# Patient Record
Sex: FEMALE | Race: WHITE | NOT HISPANIC OR LATINO | Employment: OTHER | URBAN - METROPOLITAN AREA
[De-identification: names, ages, dates, MRNs, and addresses within clinical notes are randomized per-mention and may not be internally consistent; named-entity substitution may affect disease eponyms.]

---

## 2017-01-30 ENCOUNTER — HISTORICAL (OUTPATIENT)
Dept: HEMATOLOGY/ONCOLOGY | Facility: CLINIC | Age: 72
End: 2017-01-30

## 2017-03-17 LAB
BUN SERPL-MCNC: 14 MG/DL (ref 7–18)
CREAT SERPL-MCNC: 0.77 MG/DL (ref 0.6–1.3)
GLUCOSE SERPL-MCNC: 94 MG/DL (ref 74–106)

## 2017-05-10 ENCOUNTER — HISTORICAL (OUTPATIENT)
Dept: ADMINISTRATIVE | Facility: HOSPITAL | Age: 72
End: 2017-05-10

## 2017-05-10 LAB — HCV AB SERPL QL IA: NEGATIVE

## 2017-05-15 ENCOUNTER — HISTORICAL (OUTPATIENT)
Dept: ADMINISTRATIVE | Facility: HOSPITAL | Age: 72
End: 2017-05-15

## 2017-06-12 ENCOUNTER — HISTORICAL (OUTPATIENT)
Dept: ADMINISTRATIVE | Facility: HOSPITAL | Age: 72
End: 2017-06-12

## 2017-06-12 LAB
ALBUMIN SERPL-MCNC: 3.7 GM/DL (ref 3.4–5)
ALBUMIN/GLOB SERPL: 1.5 RATIO (ref 1.1–2)
ALP SERPL-CCNC: 57 UNIT/L (ref 38–126)
ALT SERPL-CCNC: 22 UNIT/L (ref 12–78)
AST SERPL-CCNC: 14 UNIT/L (ref 15–37)
BILIRUB SERPL-MCNC: 0.5 MG/DL (ref 0.2–1)
BILIRUBIN DIRECT+TOT PNL SERPL-MCNC: 0.1 MG/DL (ref 0–0.5)
BILIRUBIN DIRECT+TOT PNL SERPL-MCNC: 0.4 MG/DL (ref 0–0.8)
BUN SERPL-MCNC: 20 MG/DL (ref 7–18)
CALCIUM SERPL-MCNC: 9.2 MG/DL (ref 8.5–10.1)
CHLORIDE SERPL-SCNC: 104 MMOL/L (ref 98–107)
CHOLEST SERPL-MCNC: 184 MG/DL (ref 0–200)
CHOLEST/HDLC SERPL: 2.5 {RATIO} (ref 0–4)
CO2 SERPL-SCNC: 28 MMOL/L (ref 21–32)
CREAT SERPL-MCNC: 0.73 MG/DL (ref 0.55–1.02)
GLOBULIN SER-MCNC: 2.5 GM/DL (ref 2.4–3.5)
GLUCOSE SERPL-MCNC: 88 MG/DL (ref 74–106)
HDLC SERPL-MCNC: 75 MG/DL (ref 35–60)
LDLC SERPL CALC-MCNC: 82 MG/DL (ref 0–129)
POTASSIUM SERPL-SCNC: 4.6 MMOL/L (ref 3.5–5.1)
PROT SERPL-MCNC: 6.2 GM/DL (ref 6.4–8.2)
SODIUM SERPL-SCNC: 138 MMOL/L (ref 136–145)
TRIGL SERPL-MCNC: 134 MG/DL (ref 30–150)
TSH SERPL-ACNC: 0.08 MIU/ML (ref 0.36–3.74)
VLDLC SERPL CALC-MCNC: 27 MG/DL

## 2017-10-20 ENCOUNTER — HISTORICAL (OUTPATIENT)
Dept: ADMINISTRATIVE | Facility: HOSPITAL | Age: 72
End: 2017-10-20

## 2018-11-05 ENCOUNTER — HISTORICAL (OUTPATIENT)
Dept: ADMINISTRATIVE | Facility: HOSPITAL | Age: 73
End: 2018-11-05

## 2018-11-06 ENCOUNTER — HISTORICAL (OUTPATIENT)
Dept: ADMINISTRATIVE | Facility: HOSPITAL | Age: 73
End: 2018-11-06

## 2018-11-08 LAB — FINAL CULTURE: NO GROWTH

## 2018-11-11 LAB — FINAL CULTURE: NORMAL

## 2018-11-20 ENCOUNTER — HISTORICAL (OUTPATIENT)
Dept: ADMINISTRATIVE | Facility: HOSPITAL | Age: 73
End: 2018-11-20

## 2018-12-19 ENCOUNTER — HISTORICAL (OUTPATIENT)
Dept: ADMINISTRATIVE | Facility: HOSPITAL | Age: 73
End: 2018-12-19

## 2019-02-08 ENCOUNTER — HISTORICAL (OUTPATIENT)
Dept: ADMINISTRATIVE | Facility: HOSPITAL | Age: 74
End: 2019-02-08

## 2019-02-08 LAB
ABS NEUT (OLG): 3.51 X10(3)/MCL (ref 2.1–9.2)
ALBUMIN SERPL-MCNC: 3.7 GM/DL (ref 3.4–5)
ALBUMIN/GLOB SERPL: 1.6 RATIO (ref 1.1–2)
ALP SERPL-CCNC: 57 UNIT/L (ref 38–126)
ALT SERPL-CCNC: 15 UNIT/L (ref 12–78)
AST SERPL-CCNC: 12 UNIT/L (ref 15–37)
BASOPHILS # BLD AUTO: 0 X10(3)/MCL (ref 0–0.2)
BASOPHILS NFR BLD AUTO: 0.7 %
BILIRUB SERPL-MCNC: 0.3 MG/DL (ref 0.2–1)
BILIRUBIN DIRECT+TOT PNL SERPL-MCNC: 0.1 MG/DL (ref 0–0.5)
BILIRUBIN DIRECT+TOT PNL SERPL-MCNC: 0.2 MG/DL (ref 0–0.8)
BUN SERPL-MCNC: 13 MG/DL (ref 7–18)
CALCIUM SERPL-MCNC: 9.3 MG/DL (ref 8.5–10.1)
CHLORIDE SERPL-SCNC: 104 MMOL/L (ref 98–107)
CO2 SERPL-SCNC: 29 MMOL/L (ref 21–32)
CREAT SERPL-MCNC: 0.68 MG/DL (ref 0.55–1.02)
EOSINOPHIL # BLD AUTO: 0.3 X10(3)/MCL (ref 0–0.9)
EOSINOPHIL NFR BLD AUTO: 5.4 %
ERYTHROCYTE [DISTWIDTH] IN BLOOD BY AUTOMATED COUNT: 12 % (ref 11.5–17)
GLOBULIN SER-MCNC: 2.3 GM/DL (ref 2.4–3.5)
GLUCOSE SERPL-MCNC: 99 MG/DL (ref 74–106)
HCT VFR BLD AUTO: 38.5 % (ref 37–47)
HGB BLD-MCNC: 12.2 GM/DL (ref 12–16)
LYMPHOCYTES # BLD AUTO: 1.2 X10(3)/MCL (ref 0.6–4.6)
LYMPHOCYTES NFR BLD AUTO: 21.2 %
MCH RBC QN AUTO: 31 PG (ref 27–31)
MCHC RBC AUTO-ENTMCNC: 31.7 GM/DL (ref 33–36)
MCV RBC AUTO: 98 FL (ref 80–94)
MONOCYTES # BLD AUTO: 0.5 X10(3)/MCL (ref 0.1–1.3)
MONOCYTES NFR BLD AUTO: 9.5 %
NEUTROPHILS # BLD AUTO: 3.5 X10(3)/MCL (ref 2.1–9.2)
NEUTROPHILS NFR BLD AUTO: 63 %
PLATELET # BLD AUTO: 304 X10(3)/MCL (ref 130–400)
PMV BLD AUTO: 9 FL (ref 9.4–12.4)
POTASSIUM SERPL-SCNC: 4.8 MMOL/L (ref 3.5–5.1)
PROT SERPL-MCNC: 6 GM/DL (ref 6.4–8.2)
RBC # BLD AUTO: 3.93 X10(6)/MCL (ref 4.2–5.4)
SODIUM SERPL-SCNC: 138 MMOL/L (ref 136–145)
WBC # SPEC AUTO: 5.6 X10(3)/MCL (ref 4.5–11.5)

## 2019-04-03 ENCOUNTER — HISTORICAL (OUTPATIENT)
Dept: ADMINISTRATIVE | Facility: HOSPITAL | Age: 74
End: 2019-04-03

## 2019-04-03 LAB
ABS NEUT (OLG): 3.66 X10(3)/MCL (ref 2.1–9.2)
ALBUMIN SERPL-MCNC: 3.8 GM/DL (ref 3.4–5)
ALBUMIN/GLOB SERPL: 1.5 RATIO (ref 1.1–2)
ALP SERPL-CCNC: 52 UNIT/L (ref 38–126)
ALT SERPL-CCNC: 17 UNIT/L (ref 12–78)
AST SERPL-CCNC: 14 UNIT/L (ref 15–37)
BASOPHILS # BLD AUTO: 0 X10(3)/MCL (ref 0–0.2)
BASOPHILS NFR BLD AUTO: 0 %
BILIRUB SERPL-MCNC: 0.4 MG/DL (ref 0.2–1)
BILIRUBIN DIRECT+TOT PNL SERPL-MCNC: 0.1 MG/DL (ref 0–0.5)
BILIRUBIN DIRECT+TOT PNL SERPL-MCNC: 0.3 MG/DL (ref 0–0.8)
BUN SERPL-MCNC: 19 MG/DL (ref 7–18)
CALCIUM SERPL-MCNC: 9.3 MG/DL (ref 8.5–10.1)
CHLORIDE SERPL-SCNC: 104 MMOL/L (ref 98–107)
CHOLEST SERPL-MCNC: 175 MG/DL (ref 0–200)
CHOLEST/HDLC SERPL: 2.4 {RATIO} (ref 0–4)
CO2 SERPL-SCNC: 28 MMOL/L (ref 21–32)
CREAT SERPL-MCNC: 0.78 MG/DL (ref 0.55–1.02)
EOSINOPHIL # BLD AUTO: 0.2 X10(3)/MCL (ref 0–0.9)
EOSINOPHIL NFR BLD AUTO: 4 %
ERYTHROCYTE [DISTWIDTH] IN BLOOD BY AUTOMATED COUNT: 13 % (ref 11.5–17)
GLOBULIN SER-MCNC: 2.6 GM/DL (ref 2.4–3.5)
GLUCOSE SERPL-MCNC: 85 MG/DL (ref 74–106)
HCT VFR BLD AUTO: 40.2 % (ref 37–47)
HDLC SERPL-MCNC: 72 MG/DL (ref 35–60)
HGB BLD-MCNC: 12.8 GM/DL (ref 12–16)
LDLC SERPL CALC-MCNC: 83 MG/DL (ref 0–129)
LYMPHOCYTES # BLD AUTO: 1.4 X10(3)/MCL (ref 0.6–4.6)
LYMPHOCYTES NFR BLD AUTO: 23 %
MCH RBC QN AUTO: 30.9 PG (ref 27–31)
MCHC RBC AUTO-ENTMCNC: 31.8 GM/DL (ref 33–36)
MCV RBC AUTO: 97.1 FL (ref 80–94)
MONOCYTES # BLD AUTO: 0.5 X10(3)/MCL (ref 0.1–1.3)
MONOCYTES NFR BLD AUTO: 9 %
NEUTROPHILS # BLD AUTO: 3.66 X10(3)/MCL (ref 2.1–9.2)
NEUTROPHILS NFR BLD AUTO: 63 %
PLATELET # BLD AUTO: 317 X10(3)/MCL (ref 130–400)
PMV BLD AUTO: 10.3 FL (ref 9.4–12.4)
POTASSIUM SERPL-SCNC: 4.4 MMOL/L (ref 3.5–5.1)
PROT SERPL-MCNC: 6.4 GM/DL (ref 6.4–8.2)
RBC # BLD AUTO: 4.14 X10(6)/MCL (ref 4.2–5.4)
SODIUM SERPL-SCNC: 139 MMOL/L (ref 136–145)
TRIGL SERPL-MCNC: 101 MG/DL (ref 30–150)
TSH SERPL-ACNC: 0.65 MIU/L (ref 0.36–3.74)
VLDLC SERPL CALC-MCNC: 20 MG/DL
WBC # SPEC AUTO: 5.8 X10(3)/MCL (ref 4.5–11.5)

## 2019-08-26 ENCOUNTER — HISTORICAL (OUTPATIENT)
Dept: ADMINISTRATIVE | Facility: HOSPITAL | Age: 74
End: 2019-08-26

## 2019-12-18 ENCOUNTER — HISTORICAL (OUTPATIENT)
Dept: ADMINISTRATIVE | Facility: HOSPITAL | Age: 74
End: 2019-12-18

## 2020-07-15 ENCOUNTER — HISTORICAL (OUTPATIENT)
Dept: ADMINISTRATIVE | Facility: HOSPITAL | Age: 75
End: 2020-07-15

## 2020-07-15 LAB
ABS NEUT (OLG): 3.93 X10(3)/MCL (ref 2.1–9.2)
ALBUMIN SERPL-MCNC: 3.9 GM/DL (ref 3.4–4.8)
ALBUMIN/GLOB SERPL: 1.7 RATIO (ref 1.1–2)
ALP SERPL-CCNC: 53 UNIT/L (ref 40–150)
ALT SERPL-CCNC: 9 UNIT/L (ref 0–55)
AST SERPL-CCNC: 15 UNIT/L (ref 5–34)
BASOPHILS # BLD AUTO: 0.03 X10(3)/MCL (ref 0–0.2)
BASOPHILS NFR BLD AUTO: 0.5 % (ref 0–1)
BILIRUB SERPL-MCNC: 0.4 MG/DL (ref 0.2–1.2)
BILIRUBIN DIRECT+TOT PNL SERPL-MCNC: 0.2 MG/DL (ref 0–0.5)
BILIRUBIN DIRECT+TOT PNL SERPL-MCNC: 0.2 MG/DL (ref 0–0.8)
BUN SERPL-MCNC: 12.3 MG/DL (ref 9.8–20.1)
CALCIUM SERPL-MCNC: 9.4 MG/DL (ref 8.4–10.2)
CHLORIDE SERPL-SCNC: 100 MMOL/L (ref 98–107)
CHOLEST SERPL-MCNC: 170 MG/DL
CHOLEST/HDLC SERPL: 3 {RATIO} (ref 0–5)
CO2 SERPL-SCNC: 28 MMOL/L (ref 23–31)
CREAT SERPL-MCNC: 0.72 MG/DL (ref 0.57–1.11)
EOSINOPHIL # BLD AUTO: 0.25 X10(3)/MCL (ref 0–0.9)
EOSINOPHIL NFR BLD AUTO: 4.2 % (ref 0–6.4)
ERYTHROCYTE [DISTWIDTH] IN BLOOD BY AUTOMATED COUNT: 12.6 % (ref 11.5–17)
GLOBULIN SER-MCNC: 2.3 GM/DL (ref 2.4–3.5)
GLUCOSE SERPL-MCNC: 98 MG/DL (ref 82–115)
HCT VFR BLD AUTO: 39.4 % (ref 37–47)
HDLC SERPL-MCNC: 66 MG/DL (ref 40–60)
HGB BLD-MCNC: 13.4 GM/DL (ref 12–16)
IMM GRANULOCYTES # BLD AUTO: 0.02 10*3/UL (ref 0–0.02)
IMM GRANULOCYTES NFR BLD AUTO: 0.3 % (ref 0–0.43)
LDLC SERPL CALC-MCNC: 92 MG/DL (ref 50–140)
LYMPHOCYTES # BLD AUTO: 1.35 X10(3)/MCL (ref 0.6–4.6)
LYMPHOCYTES NFR BLD AUTO: 22.5 % (ref 16–44)
MCH RBC QN AUTO: 32.1 PG (ref 27–31)
MCHC RBC AUTO-ENTMCNC: 34 GM/DL (ref 33–36)
MCV RBC AUTO: 94.3 FL (ref 80–94)
MONOCYTES # BLD AUTO: 0.43 X10(3)/MCL (ref 0.1–1.3)
MONOCYTES NFR BLD AUTO: 7.2 % (ref 4–12.1)
NEUTROPHILS # BLD AUTO: 3.93 X10(3)/MCL (ref 2.1–9.2)
NEUTROPHILS NFR BLD AUTO: 65.3 % (ref 43–73)
NRBC BLD AUTO-RTO: 0 % (ref 0–0.2)
PLATELET # BLD AUTO: 283 X10(3)/MCL (ref 130–400)
PMV BLD AUTO: 9.4 FL (ref 7.4–10.4)
POTASSIUM SERPL-SCNC: 4.7 MMOL/L (ref 3.5–5.1)
PROT SERPL-MCNC: 6.2 GM/DL (ref 5.8–7.6)
RBC # BLD AUTO: 4.18 X10(6)/MCL (ref 4.2–5.4)
SODIUM SERPL-SCNC: 135 MMOL/L (ref 136–145)
TRIGL SERPL-MCNC: 61 MG/DL (ref 0–150)
TSH SERPL-ACNC: 0.66 UIU/ML (ref 0.35–4.94)
VLDLC SERPL CALC-MCNC: 12 MG/DL
WBC # SPEC AUTO: 6 X10(3)/MCL (ref 4.5–11.5)

## 2021-02-02 ENCOUNTER — HISTORICAL (OUTPATIENT)
Dept: ADMINISTRATIVE | Facility: HOSPITAL | Age: 76
End: 2021-02-02

## 2021-02-03 ENCOUNTER — HISTORICAL (OUTPATIENT)
Dept: ADMINISTRATIVE | Facility: HOSPITAL | Age: 76
End: 2021-02-03

## 2021-02-17 ENCOUNTER — HISTORICAL (OUTPATIENT)
Dept: ADMINISTRATIVE | Facility: HOSPITAL | Age: 76
End: 2021-02-17

## 2021-03-17 ENCOUNTER — HISTORICAL (OUTPATIENT)
Dept: ADMINISTRATIVE | Facility: HOSPITAL | Age: 76
End: 2021-03-17

## 2021-05-25 ENCOUNTER — HISTORICAL (OUTPATIENT)
Dept: ADMINISTRATIVE | Facility: HOSPITAL | Age: 76
End: 2021-05-25

## 2021-05-25 LAB
ALBUMIN SERPL-MCNC: 4 GM/DL (ref 3.4–4.8)
ALBUMIN/GLOB SERPL: 1.4 RATIO (ref 1.1–2)
ALP SERPL-CCNC: 50 UNIT/L (ref 40–150)
ALT SERPL-CCNC: 11 UNIT/L (ref 0–55)
AST SERPL-CCNC: 15 UNIT/L (ref 5–34)
BILIRUB SERPL-MCNC: 0.4 MG/DL (ref 0.2–1.2)
BILIRUBIN DIRECT+TOT PNL SERPL-MCNC: 0.2 MG/DL (ref 0–0.5)
BILIRUBIN DIRECT+TOT PNL SERPL-MCNC: 0.2 MG/DL (ref 0–0.8)
BUN SERPL-MCNC: 10.9 MG/DL (ref 9.8–20.1)
CALCIUM SERPL-MCNC: 10.1 MG/DL (ref 8.4–10.2)
CHLORIDE SERPL-SCNC: 102 MMOL/L (ref 98–107)
CHOLEST SERPL-MCNC: 164 MG/DL
CHOLEST/HDLC SERPL: 3 {RATIO} (ref 0–5)
CO2 SERPL-SCNC: 30 MMOL/L (ref 23–31)
CREAT SERPL-MCNC: 0.83 MG/DL (ref 0.57–1.11)
GLOBULIN SER-MCNC: 2.8 GM/DL (ref 2.4–3.5)
GLUCOSE SERPL-MCNC: 98 MG/DL (ref 82–115)
HDLC SERPL-MCNC: 59 MG/DL (ref 40–60)
LDLC SERPL CALC-MCNC: 92 MG/DL (ref 50–140)
POTASSIUM SERPL-SCNC: 4.6 MMOL/L (ref 3.5–5.1)
PROT SERPL-MCNC: 6.8 GM/DL (ref 5.8–7.6)
SODIUM SERPL-SCNC: 138 MMOL/L (ref 136–145)
TRIGL SERPL-MCNC: 66 MG/DL (ref 0–150)
VLDLC SERPL CALC-MCNC: 13 MG/DL

## 2021-08-24 LAB — CRC RECOMMENDATION EXT: NORMAL

## 2022-01-24 ENCOUNTER — HISTORICAL (OUTPATIENT)
Dept: LAB | Facility: HOSPITAL | Age: 77
End: 2022-01-24

## 2022-01-24 LAB
ALBUMIN SERPL-MCNC: 4.1 GM/DL (ref 3.4–4.8)
ALBUMIN/GLOB SERPL: 1.6 RATIO (ref 1.1–2)
ALP SERPL-CCNC: 57 UNIT/L (ref 40–150)
ALT SERPL-CCNC: 9 UNIT/L (ref 0–55)
AST SERPL-CCNC: 16 UNIT/L (ref 5–34)
BILIRUB SERPL-MCNC: 0.3 MG/DL (ref 0.2–1.2)
BILIRUBIN DIRECT+TOT PNL SERPL-MCNC: 0.1 MG/DL (ref 0–0.5)
BILIRUBIN DIRECT+TOT PNL SERPL-MCNC: 0.2 MG/DL (ref 0–0.8)
BUN SERPL-MCNC: 13.4 MG/DL (ref 9.8–20.1)
CALCIUM SERPL-MCNC: 10 MG/DL (ref 8.4–10.2)
CHLORIDE SERPL-SCNC: 99 MMOL/L (ref 98–107)
CO2 SERPL-SCNC: 27 MMOL/L (ref 23–31)
CREAT SERPL-MCNC: 0.8 MG/DL (ref 0.57–1.11)
DEPRECATED CALCIDIOL+CALCIFEROL SERPL-MC: 59.4 NG/ML (ref 30–80)
GLOBULIN SER-MCNC: 2.5 GM/DL (ref 2.4–3.5)
GLUCOSE SERPL-MCNC: 95 MG/DL (ref 82–115)
POTASSIUM SERPL-SCNC: 4.3 MMOL/L (ref 3.5–5.1)
PROT SERPL-MCNC: 6.6 GM/DL (ref 5.8–7.6)
SODIUM SERPL-SCNC: 135 MMOL/L (ref 136–145)
URATE SERPL-MCNC: 2.6 MG/DL (ref 2.6–6)

## 2022-03-25 LAB
BCS RECOMMENDATION EXT: NORMAL
BMD RECOMMENDATION EXT: NORMAL

## 2022-04-07 ENCOUNTER — HISTORICAL (OUTPATIENT)
Dept: ADMINISTRATIVE | Facility: HOSPITAL | Age: 77
End: 2022-04-07
Payer: MEDICARE

## 2022-04-23 VITALS
WEIGHT: 186.31 LBS | HEIGHT: 60 IN | BODY MASS INDEX: 36.58 KG/M2 | OXYGEN SATURATION: 97 % | SYSTOLIC BLOOD PRESSURE: 118 MMHG | DIASTOLIC BLOOD PRESSURE: 68 MMHG

## 2022-04-30 NOTE — DISCHARGE SUMMARY
Patient:   Mary Lou Grajeda            MRN: [id number]            FIN: [id number]               Age:   73 years     Sex:  Female     :  1945   Associated Diagnoses:   Chronic constipation; DDD (degenerative disc disease), lumbar; Acid reflux; HTN (hypertension); Hyperlipemia; Hypothyroidism; Impaired gait and mobility; Lumbar stenosis; OAB (overactive bladder); Osteoarthritis of right hip; Personal history of breast cancer   Author:   Glen Avila      Discharge Information   Discharge Summary Information:  Discharged  2018.         Admitting physician: Franc Bal MD.         Discharge diagnosis: Chronic constipation (KMQ96-JI K59.00), DDD (degenerative disc disease), lumbar (URH66-HQ M51.36), Acid reflux (EAX59-HB K21.9), HTN (hypertension) (LTC98-CR I10), Hyperlipemia (LRV06-KI E78.5), Hypothyroidism (FYI93-AU E03.9), Impaired gait and mobility (MGN60-OE R26.89), Lumbar stenosis (MRP78-FE M48.061), OAB (overactive bladder) (FHS53-NZ N32.81), Osteoarthritis of right hip (AUF23-ZT M16.11), Personal history of breast cancer (CTB04-MD Z85.3).         Discharge medications: OTHER MEDICATIONS (Selected)   Inpatient Medications  Canceled  CeleBREX: 200 mg, form: Cap, Oral, Daily, first dose 18 9:00:00 CST  Completed  Ancef 2gm/50ml D5W (Premix): 2 gm, form: Infusion, IV Piggyback, q6hr, Infuse over: 30 minute(s), Order duration: 3 dose(s), first dose 18 17:00:00 CST, stop date 18 10:59:00 CST, Last dose within 24 hours after surgery.  Ancef: 2 gm, form: Infusion, IV Piggyback, On Call, Infuse over: 30 minute(s), Order duration: 1 dose(s), first dose 18 7:00:00 CST  Demerol HCl 25 mg/mL injectable solution: 25 mg, form: Injection, IV Push, As Indicated PRN for other (see comment), Order duration: 2 dose(s), first dose 18 11:30:00 CST, stop date Limited # of times, titrate for shivering  Neurontin 300 mg oral capsule: 300 mg, form: Cap, Oral, Once, first dose  11/28/18 12:00:00 CST, stop date 11/28/18 12:00:00 CST  Ofirmev 10 mg/mL intravenous solution: 1,000 mg, form: Infusion, IV Piggyback, q6hr, Infuse over: 15 minute(s), Order duration: 2 dose(s), first dose 11/27/18 16:00:00 CST, stop date 11/28/18 3:59:00 CST, Post op  Toradol: 30 mg, form: Injection, IV Push, [id number], Order duration: 3 dose(s), first dose 11/27/18 18:00:00 CST, stop date 11/28/18 11:59:00 CST  Discontinued  Buffered Lidocaine 1% - 1mL syringe: 0.5 mL, 5 mg =, form: Injection, ID, As Directed PRN for other (see comment), first dose 11/27/18 6:58:00 CST, May inject 0.5mL at IV site, if not allergic  Colace 100 mg oral capsule: 100 mg, form: Cap, Oral, Daily PRN for constipation, first dose 11/27/18 9:19:00 CST  Colace 100 mg oral capsule: 100 mg, form: Cap, Oral, Daily, first dose 11/28/18 9:00:00 CST  Dextrose 50% and Water  (50 mL vial/syringe): 25 mL, 12.5 gm =, form: Injection, IV Push, As Directed PRN for blood glucose, Infuse over: 5 minute(s), first dose 11/27/18 11:02:00 CST, Unconscious patient: Repeat as ordered per protocol.  Dextrose 50% and Water  (50 mL vial/syringe): 25 mL, 12.5 gm =, form: Injection, IV Push, Once PRN for blood glucose, Infuse over: 5 minute(s), first dose 11/27/18 11:02:00 CST, Unconscious patient: Look for other source of altered mental status.  Dextrose 50% and Water  (50 mL vial/syringe): 50 mL, 25 gm =, form: Injection, IV Push, As Directed PRN for blood glucose, Infuse over: 5 minute(s), first dose 11/27/18 11:02:00 CST, Unconscious patient: Repeat as ordered per protocol.  Dextrose 50% in Water intravenous solution: 25 mL, 12.5 gm =, form: Injection, IV Push, As Directed PRN for blood glucose, Infuse over: 5 minute(s), first dose 11/27/18 11:02:00 CST, Conscious patient.  Dilaudid: 0.5 mg, form: Injection, IV Push, q5min PRN for pain, Order duration: 20 dose(s), first dose 11/27/18 11:01:00 CST, stop date Limited # of times, moderate/severe.  May repeat  every 5 minutes until patient reaches pain level of mild or less, not to exc...  Dulcolax Laxative 10 mg RECTAL suppository: 10 mg, form: Supp, CO, Daily PRN for constipation, first dose 11/27/18 11:02:00 CST, give in unrelieved with MOM and Miralax  Linzess 145 mcg oral capsule: 145 mcg, form: Cap, Oral, Daily, first dose 11/28/18 9:00:00 CST  Milk of Magnesia: 30 mL, form: Susp, Oral, Daily PRN for constipation, first dose 11/27/18 11:02:00 CST  MiraLax (polyethylene glycol 3350): 17 gm, form: Powder-Recon, Oral, Daily PRN for constipation, first dose 11/27/18 11:02:00 CST, Give if unrelieved with Milk of Magnesia  MiraLax (polyethylene glycol 3350): 17 gm, form: Powder-Recon, Oral, Daily, first dose 11/28/18 9:00:00 CST, Give if unrelieved with Milk of Magnesia  NexIUM 40 mg oral delayed release capsule: 40 mg, form: Cap, Oral, Daily, first dose 11/28/18 6:00:00 CST  [place] 5 mg-325 mg oral tablet: 1 tab(s), form: Tab, Oral, q4hr PRN for pain, first dose 11/28/18 14:36:00 CST, Waste Code GILL  Norco 7.5 mg-325 mg oral tablet: 1 tab(s), form: Tab, Oral, q4hr PRN for as needed for pain, first dose 11/29/18 10:05:00 CST, Waste Code GILL  Norvasc 10 mg oral tablet: 10 mg, form: Tab, Oral, Daily, first dose 11/28/18 9:00:00 CST  Plasmalyte 1,000 mL: 1,000 mL, 1,000 mL, IV, 20 mL/hr, start date 11/27/18 6:58:00 CST  Protonix 40 mg ORAL enteric coated tablet: 40 mg, form: Tab-EC, Oral, Daily, first dose 11/28/18 6:00:00 CST  Restoril 15 mg oral capsule: 15 mg, form: Cap, Oral, At Bedtime PRN for insomnia, first dose 11/27/18 11:02:00 CST  Senokot S 50 mg-8.6 mg oral tablet: 2 tab(s), form: Tab, Oral, Once a day (at bedtime), first dose 11/27/18 21:00:00 CST  Sodium Chloride 0.9% intravenous solution 1,000 mL: 1,000 mL, 1,000 mL, IV, 150 mL/hr, start date 11/27/18 11:02:00 CST  Toradol: 10 mg, form: Tab, Oral, q8hr, Order duration: 4 day(s), first dose 11/28/18 14:00:00 CST, stop date 12/02/18 13:59:00 CST  Tylenol: 500 mg,  form: Tab, Oral, q4hr, first dose 18 12:00:00 CST, atc  Ultram: 50 mg, form: Tab, Oral, q4hr PRN for pain, first dose 18 11:02:00 CST, Pain Score 1-10  Valium: 5 mg, form: Tab, Oral, q6hr PRN for spasm, first dose 18 11:02:00 CST  Zofran ODT 4 mg oral tablet, disintegratin mg, form: Tab-Dis, Oral, q8hr PRN for nausea/vomiting, first dose 18 11:02:00 CST  Zofran: 4 mg, form: Injection, IV Push, Once-Unscheduled PRN for nausea, first dose 18 11:01:00 CST  Zofran: 4 mg, form: Injection, IV Push, q6hr PRN for nausea/vomiting, first dose 18 11:02:00 CST, If unable to tolerate PO  ascorbic acid: 500 mg, form: Tab, Oral, At Bedtime, first dose 18 21:00:00 CST  aspirin 81 mg oral Delayed Release (EC) tablet: 81 mg, form: Tab-EC, Oral, BID, first dose 18 9:00:00 CST  cyclobenzaprine: 10 mg, form: Tab, Oral, TID PRN for as needed for muscle spasm, first dose 18 10:22:00 CST  glucagon: 1 mg, form: Injection, IM, q10min PRN for blood glucose, first dose 18 11:02:00 CST, Conscious Patient with NO IV access available and BG < 45 mg/dl.  glucagon: 1 mg, form: Injection, IM, q10min PRN for blood glucose, first dose 18 11:02:00 CST, Unconscious patient: Patient with NO IV access available and BG < 70 mg/dl.  hydrALAZINE (Apresoline) Inj.: 10 mg, form: Injection, IV Push, q4hr PRN for hypertension, first dose 18 11:02:00 CST, SBP >160  insulin lispro: 2-14 units, form: Injection, Subcutaneous, As Directed PRN for blood glucose, first dose 18 11:02:00 CST  levothyroxine 25 mcg (0.025 mg) oral tablet: 75 mcg, form: Tab, Oral, Daily, first dose 18 6:00:00 CST  lisinopril 10 mg oral tablet: 20 mg, form: Tab, Oral, Daily, first dose 18 9:00:00 CST  morphine: 4 mg, form: Injection, IV Push, q3hr PRN for breakthru pain, first dose 18 11:02:00 CST, pain score 7-10 after PO meds  Prescriptions  Prescribed  CeleBREX 200 mg oral capsule: 200 mg =  1 cap(s), Oral, BID, # 180 cap(s), 3 Refill(s), Pharmacy: [facility] [id number]  acetaminophen-hydrocodone 325 mg-7.5 mg oral tablet: 1 tab(s), Oral, q4hr, PRN PRN as needed for pain, X 7 day(s), # 42 tab(s), 0 Refill(s)  aspirin 81 mg oral Delayed Release (EC) tablet: 81 mg = 1 tab(s), Oral, BID, # 84 tab(s), 0 Refill(s)  aspirin 81 mg oral tablet, CHEWABLE: 81 mg = 1 tab(s), Oral, Daily, # 30 tab(s), 0 Refill(s), other reason (Rx)  cyclobenzaprine 10 mg oral tablet: 10 mg = 1 tab(s), Oral, TID, PRN PRN as needed for muscle spasm, X 2 week(s), # 42 tab(s), 0 Refill(s)  ketorolac 10 mg oral tablet: 10 mg = 1 tab(s), Oral, q8hr, X 4 day(s), # 12 tab(s), 0 Refill(s)  levothyroxine 75 mcg (0.075 mg) oral tablet: 75 mcg = 1 tab(s), Oral, Daily, # 30 tab(s), 11 Refill(s)  Discontinued  Ultram 50 mg oral tablet: 50 mg = 1 tab(s), Oral, q4hr, PRN PRN pain, X 7 day(s), # 42 tab(s), 0 Refill(s)  traMADol 50 mg oral tablet: 50 mg = 1 tab(s), Oral, q12hr, PRN PRN as needed for pain, do not take before work or driving. may cause drowsiness.   not to exceed 400 mg/day, # 60 tab(s), 0 Refill(s)  Documented Medications  Documented  AMLODIPINE BESYLATE 10 MG TABS: 10 mg = 1 tab(s), Oral, Daily  Caltrate 600 + D oral tablet: 2 tab(s), Oral, Daily, # 60 tab(s), 0 Refill(s)  Citrucel Lax: = 1 tab(s), Oral, Daily, 0 Refill(s)  Co-Q10 200 mg oral capsule: = 1 cap(s), Oral, Daily-Resp, 0 Refill(s)  Crestor 10 mg oral tablet: 10 mg = 1 tab(s), Oral, M,W,F, 0 Refill(s)  Dulcolax Stool Softener 100 mg oral capsule: 200 mg = 2 cap(s), Oral, Daily, 0 Refill(s)  FLUCONAZOLE 150 MG TABS: 150 mg = 1 tab(s), Oral, Once  LINZESS 145 MCG CAPS: 145 mcg = 1 cap(s), Oral, Daily  MiraLax oral powder for reconstitution: = 1 tbsp, Oral, Daily, # 1 bottle(s), 0 Refill(s)  Nexium 40 mg oral delayed release capsule (pt. own): = 1 tab(s), Oral, Daily, 0 Refill(s)  OXYBUTYNIN CHLORIDE ER 10 MG TB24: 10 mg = 1 tab(s), Oral, Daily  Prevagen: Prevagen, 1  cap(s), Oral, Daily, 0 Refill(s)  Red Yeast Rice 600 mg oral capsule: = 2 tab(s), Oral, Daily, 0 Refill(s)  benAZEPRIL 20 mg oral tablet: 20 mg = 1 tab(s), Oral, Daily, 0 Refill(s)  Deleted  elppa CoQ10 50 mg oral capsule: 0 Refill(s)  Discontinued  aspirin 81 mg oral tablet, CHEWABLE: 81 mg = 1 tab(s), Oral, Daily, 0 Refill(s).       Hospital Course   Discharge diagnoses: As above  Procedure performed: robotic-assisted  Right total hip arthroplasty  The patient had no complications during the hospital stay and was discharged home in stable condition partial weightbearing through the  Right lower extremity with assistance of a walker.  Consults for physical therapy and rehab were given to the patient as well as a follow-up appointment in our office in 4 weeks for reevaluation.  The patient was instructed on wound care and dressing changes as well as to continue ANGIE hose while at home.  Prescriptions were given for continued DVT prophylaxis and pain control.  Home medications were resumed please see discharge med rec for these.         Discharge Plan   Discharge Summary Plan   Discharge disposition: discharge to home into the care of family member.     Prescriptions: written and given to patient.

## 2022-04-30 NOTE — OP NOTE
Patient:   Mary Lou Grajeda            MRN: [id number]            FIN: [id number]               Age:   73 years     Sex:  Female     :  1945   Associated Diagnoses:   None   Author:   Franc Bal MD      Operative Note   DATE OF SURGERY: 2018   _    SURGEON:  Franc Bal MD  First assistant:   Glen Nair, certified physician assistant  PREOPERATIVE DIAGNOSIS:  _Advanced right hip Osteoarthritis with femoral head collapse and severe leg length discrepancy with right shorter than left lower extremity    POSTOPERATIVE DIAGNOSIS: Advanced right_ hip Osteoarthritis with femoral head collapse and severe leg length discrepancy with right shorter than left lower extremity    PROCEDURE: Robotic assisted right_ total hip arthroplasty.    ANESTHESIA:  Spinal.    SPECIMENS: Right femoral head.    ESTIMATED BLOOD LOSS: 165 ml.    IMPLANTS:  Magdaleno Trident II acetabular cup size _48 mm, liner 36 mm inner diameter, neutral acetabular liner, screw x1, 132 degree size 4 Accolade-2 femoral stem, standard neck length 36 mm diameter Biolox head.    INDICATION FOR SURGERY:  This is patient who presented to my clinic with complaints of hip pain.  The patient tried and failed all conservative measures with regards to their hips.  The patient continued to have severe pain from their hip.  He tried and failed all conservative measures including anti-inflammatories, activity modification, as well as the use of a cane and narcotics.  The patient felt they had reached a point of disability with regards to his left hip.  The risks, benefits and alternatives and complications of total hip arthroplasty was discussed in detail including but not limited to infection, need for revision surgery, incomplete resolution of pain, scarring, bleeding, instability, DVT, PE and death.  The patient elected to proceed with surgical intervention.    PROCEDURE IN DETAIL:  The patient was seen preoperatively in the pre-op holding  area.  They were marked and consented for surgery.  The risks, benefits and alternatives were again discussed in detail and all questions were answered.  No guarantees were made.  The patient was taken to the Operating Room and was placed under spinal anesthesia.  The patient was placed in the lateral decubitus position with his right hip exposed.  All bony prominences were well padded.  The right hip was prepped and draped in the normal sterile fashion.  Timeout was performed verifying correct patient, site and procedure, and all were in agreement.  The patient was also noted to have preoperative TXA as well as preoperative antibiotics.  Three percutaneous incisions were made over the iliac crest and three Schanz pins were inserted into the iliac wing.  The Integrity Tracking robotic pelvic array was then placed on the Schanz pins and tightened appropriately.  Afterwards, attention was turned toward exposure.  Standard modified direct lateral approach to the hip was performed.  10 to 15 cm incision was made overlying the greater trochanter.  It was taken down through the gluteus fascia.  The gluteus muscles were bluntly  in line with muscle fibers.  We placed checkpoint into the proximal femur at this point.  I then incised the anterior one-fourth of the gluteus medius while leaving behind a tendinous cuff for later repair. A superior split was made through the tendon of the gluteus minimus, followed by a posterior capsulotomy along the neck of the femur. The hip was externally rotated until the fibers of the gluteus intermedius were exposed. Retractors were repositioned to expose the anterior capsule and an anterior capsulotomy was performed. The hip was then externally rotated, dislocated, and a femoral neck osteotomy was at the appropriate position. The femoral head and acetabulum were denuded of articular cartilage down to subchondral bone.    Acetabular retractors were placed.  Long handle knife was used to remove  the labrum.  Bovie cautery was used to remove the pulvinar exposing the fovea.  Using the [employer] robotic checkpoint, we did register the acetabulum.  We did get good acetabular registration.  Reaming was performed with a 47 mm reamer to the planned depth and position.Utilizing the robotic interactive arm the 48 mm acetabular component was impacted and pressfit in 40 degrees of abduction and 20 degrees of flexion.  We opened the cup, impacted this into position and checked that it was down.  Screw x1 was then drilled and placed through the implant into the ileum.  We placed a neutral liner and impacted this into position.  Attention was then turned toward the femur.  Cookie cutter was used to lateralize the femur followed by the canal finder to gain access to the femoral canal.  We broached sequentially starting at a 0.   Size 4 was felt to be stable with good recreation of leg length and stability in 15 degrees of anteversion. we trialed this with a  132 degree femoral neck, took through full range of motion and found to have excellent stability with no dislocation, subluxation or impingement, as well as recreation of leg length and offset.  Accolade-2 stem was opened and impacted into position.  Stem came to a firm stop with a change in pitch.  We then retrialed.  Trunion was copiously irrigated and cleaned and a standard neck length 36 mm diameter Biolox head was impacted in position and checked to be down.  This was then reduced, taken through full and found to have excellent stability.    We then used lavage and copiously irrigated the wound with normal saline afterwards. We then injected our joint cocktail in the surrounding soft tissues, as well as the capsule.  Drill hole were made in the proximal femur, and through these drill holes the gluteus minimus was repaired to its insertion. The anterior one-fourth of the gluteus medius was repaired to its tendinous cuff. A looped PDS was used the close the iliotibial  band, followed by reapproximation of skin with 2-0 vicryl suture.  A running 4-0 monocryl suture was used for skin closure along with steristrips.   Sterile dressings were applied and patient was taken to recovery room in stable condition.

## 2022-05-12 ENCOUNTER — PATIENT OUTREACH (OUTPATIENT)
Dept: ADMINISTRATIVE | Facility: HOSPITAL | Age: 77
End: 2022-05-12
Payer: MEDICARE

## 2022-05-12 NOTE — PROGRESS NOTES
Population Health Outreach.Records Received, hyper-linked into chart at this time. The following record(s)  below were uploaded for Health Maintenance .    03.25.2022-MAMMOGRAM  03.25.2022-DEXA SCAN

## 2022-07-25 ENCOUNTER — OFFICE VISIT (OUTPATIENT)
Dept: FAMILY MEDICINE | Facility: CLINIC | Age: 77
End: 2022-07-25
Payer: MEDICARE

## 2022-07-25 VITALS
BODY MASS INDEX: 35.53 KG/M2 | WEIGHT: 179.81 LBS | DIASTOLIC BLOOD PRESSURE: 74 MMHG | OXYGEN SATURATION: 98 % | HEART RATE: 72 BPM | SYSTOLIC BLOOD PRESSURE: 116 MMHG | TEMPERATURE: 98 F | RESPIRATION RATE: 16 BRPM

## 2022-07-25 DIAGNOSIS — Z00.00 MEDICARE ANNUAL WELLNESS VISIT, SUBSEQUENT: Primary | ICD-10-CM

## 2022-07-25 DIAGNOSIS — Z12.31 BREAST CANCER SCREENING BY MAMMOGRAM: ICD-10-CM

## 2022-07-25 DIAGNOSIS — E55.9 VITAMIN D DEFICIENCY: ICD-10-CM

## 2022-07-25 DIAGNOSIS — E78.5 HYPERLIPIDEMIA, UNSPECIFIED HYPERLIPIDEMIA TYPE: ICD-10-CM

## 2022-07-25 DIAGNOSIS — K59.09 CHRONIC CONSTIPATION: ICD-10-CM

## 2022-07-25 DIAGNOSIS — K21.9 GASTROESOPHAGEAL REFLUX DISEASE, UNSPECIFIED WHETHER ESOPHAGITIS PRESENT: ICD-10-CM

## 2022-07-25 DIAGNOSIS — M85.80 OSTEOPENIA, UNSPECIFIED LOCATION: ICD-10-CM

## 2022-07-25 DIAGNOSIS — Z78.0 POSTMENOPAUSAL: ICD-10-CM

## 2022-07-25 DIAGNOSIS — Z71.89 ADVANCED CARE PLANNING/COUNSELING DISCUSSION: ICD-10-CM

## 2022-07-25 DIAGNOSIS — E03.9 HYPOTHYROIDISM, UNSPECIFIED TYPE: ICD-10-CM

## 2022-07-25 DIAGNOSIS — M17.10 ARTHRITIS OF KNEE: ICD-10-CM

## 2022-07-25 DIAGNOSIS — Z85.3 HISTORY OF BREAST CANCER: ICD-10-CM

## 2022-07-25 DIAGNOSIS — I25.10 ARTERIOSCLEROSIS OF CORONARY ARTERY: ICD-10-CM

## 2022-07-25 DIAGNOSIS — N32.81 OVERACTIVE BLADDER: ICD-10-CM

## 2022-07-25 DIAGNOSIS — I10 HYPERTENSION, UNSPECIFIED TYPE: ICD-10-CM

## 2022-07-25 PROBLEM — Z28.21 TETANUS, DIPHTHERIA, AND ACELLULAR PERTUSSIS (TDAP) VACCINATION DECLINED: Status: ACTIVE | Noted: 2022-07-25

## 2022-07-25 PROCEDURE — G0439 PR MEDICARE ANNUAL WELLNESS SUBSEQUENT VISIT: ICD-10-PCS | Mod: ,,, | Performed by: FAMILY MEDICINE

## 2022-07-25 PROCEDURE — G0439 PPPS, SUBSEQ VISIT: HCPCS | Mod: ,,, | Performed by: FAMILY MEDICINE

## 2022-07-25 RX ORDER — ACETAMINOPHEN 160 MG/5ML
200 SUSPENSION, ORAL (FINAL DOSE FORM) ORAL
COMMUNITY

## 2022-07-25 RX ORDER — ESOMEPRAZOLE MAGNESIUM 40 MG/1
40 CAPSULE, DELAYED RELEASE ORAL
COMMUNITY
Start: 2022-01-24 | End: 2023-02-28 | Stop reason: SDUPTHER

## 2022-07-25 RX ORDER — AMPICILLIN TRIHYDRATE 250 MG
1200 CAPSULE ORAL DAILY
COMMUNITY

## 2022-07-25 RX ORDER — POLYETHYLENE GLYCOL 3350 17 G/17G
17 POWDER, FOR SOLUTION ORAL
COMMUNITY

## 2022-07-25 RX ORDER — OXYBUTYNIN CHLORIDE 10 MG/1
10 TABLET, EXTENDED RELEASE ORAL
COMMUNITY
Start: 2022-01-24 | End: 2023-07-26

## 2022-07-25 RX ORDER — BENAZEPRIL HYDROCHLORIDE 20 MG/1
20 TABLET ORAL
COMMUNITY
Start: 2022-01-24 | End: 2023-02-28 | Stop reason: SDUPTHER

## 2022-07-25 RX ORDER — AMLODIPINE BESYLATE 10 MG/1
10 TABLET ORAL
COMMUNITY
Start: 2022-01-24 | End: 2023-02-28 | Stop reason: SDUPTHER

## 2022-07-25 RX ORDER — LEVOTHYROXINE SODIUM 75 UG/1
125 TABLET ORAL
COMMUNITY
Start: 2022-01-24 | End: 2023-02-27 | Stop reason: SDUPTHER

## 2022-07-25 RX ORDER — ROSUVASTATIN CALCIUM 10 MG/1
10 TABLET, COATED ORAL
COMMUNITY
Start: 2022-01-24 | End: 2023-12-04 | Stop reason: SDUPTHER

## 2022-07-25 NOTE — PROGRESS NOTES
Subjective:        Patient ID: Mary Lou Grajeda is a 77 y.o. female.    Chief Complaint: Medicare DEVI       presents to the clinic unaccompanied for her wellness visit.  She is due for labs    She reports fatigue since having covid 7/2/22. Dx at [facility].  Was given paxlovid.  Also states last Wednesday her freezer was leaking freon and she thinks she had an allergic reaction to it.  She had chest tightness/sob and dizziness.  She went stay at a family members house.  She is getting rid of freezer.     The patient had her right hip replacement done on November 27, 2018, with Dr. Bal. She also had a right radial head fx from fall 2/2021. saw dr. travis. did not need surgery.    The patient has a history of hypertension, HLD, carotid artery stenosis, and CAD and is compliant with her medications which include amlodipine, benazepril and asa, Crestor, CoQ10 and red yeast rice. She denies any chest pain shortness of breath or weakness. Dr. Younger is her cardiologist. lov 1/2022. note in chart. Has appt next week.  Just did carotid us with him.  Does not know results yet.      She has a history of hypothyroidism and is compliant with her Synthroid.     She has constipation and is on linzess 2x/week. Has GERD and is on nexium. GI is dr. ramon. doing some pelvic floor PT at [facility].  Doing pilates now and is enjoying.     she has OAB and on oxybuytnin. she does not take daily. she also has a pessary and has seen dr. carreon lov 12/2021.  He retired.  She has appointment with eve CHENEY in 8/2022.    Her mammograms were ordered by Dr. Hassan due to her history of breast cancer which was dx in 1994 and treated with chemo and radiation as well as tamoxifen x 5 years. Her last appt with her was 2/2019. She discharged her and has her to return only prn. Last mmg at [facility] 3/2022 was normal. copy in her chart. She ordered her DEXA scans as well. Last 3/2022. She does them at [facility] on [address]. She has osteopenia and is on calcium and  vitamin d. she also reports a history of low vit d. Her Pap smears are done with her GYN, Dr. Manjarrez. She has appointment soon 9/2022. Her last colonoscopy was done in 8/2021 with Dr. Fine. recommended repeat in 5 years. copy in chart.    Is ALLERGIC to Compazine and Darvon. She drinks alcohol rarely. She does not smoke. completed covid series. utd on pneumovax and tdap. Got shingrix at pharmacy          Advance Care Planning  Advanced care planning discussed and paperwork given.    I attest that I have had a face to face discussion with patient and or surrogate decision maker.   Included surrogate decision maker: NO  Advanced directive in chart: NO  LAPOST: NO    Total time spent: 16 minutes              Review of Systems   Constitutional: Positive for fatigue.   HENT: Negative.    Eyes: Negative.    Respiratory: Negative.    Cardiovascular: Negative.    Gastrointestinal: Negative.    Endocrine: Negative.    Genitourinary: Negative.    Musculoskeletal: Positive for arthralgias.   Allergic/Immunologic: Negative.    Neurological: Negative.    Hematological: Negative.    Psychiatric/Behavioral: Negative.          Review of patient's allergies indicates:   Allergen Reactions    Prochlorperazine     Propoxyphene-asa-caffeine     Propoxyphene Nausea Only      Vitals:    07/25/22 1345   BP: 116/74   Pulse: 72   Resp: 16   Temp: 98.3 °F (36.8 °C)   SpO2: 98%   Weight: 81.6 kg (179 lb 12.8 oz)      Social History     Socioeconomic History    Marital status:    Tobacco Use    Smoking status: Never Smoker    Smokeless tobacco: Never Used   Social History Narrative    ** Merged History Encounter **           No family history on file.       Objective:     Physical Exam  Vitals and nursing note reviewed.   Constitutional:       Appearance: Normal appearance. She is obese.   HENT:      Head: Normocephalic and atraumatic.      Nose: Nose normal.      Mouth/Throat:      Mouth: Mucous membranes are moist.      Pharynx:  Oropharynx is clear.   Eyes:      Extraocular Movements: Extraocular movements intact.   Cardiovascular:      Rate and Rhythm: Normal rate and regular rhythm.      Pulses: Normal pulses.      Heart sounds: Normal heart sounds.   Pulmonary:      Effort: Pulmonary effort is normal.      Breath sounds: Normal breath sounds.   Musculoskeletal:         General: Normal range of motion.      Cervical back: Normal range of motion.   Skin:     General: Skin is warm and dry.   Neurological:      General: No focal deficit present.      Mental Status: She is alert and oriented to person, place, and time. Mental status is at baseline.   Psychiatric:         Mood and Affect: Mood normal.       Current Outpatient Medications on File Prior to Visit   Medication Sig Dispense Refill    amLODIPine (NORVASC) 10 MG tablet Take 10 mg by mouth.      benazepriL (LOTENSIN) 20 MG tablet Take 20 mg by mouth.      esomeprazole (NEXIUM) 40 MG capsule Take 40 mg by mouth.      levothyroxine (SYNTHROID) 75 MCG tablet Take 125 mcg by mouth.      linaCLOtide (LINZESS) 145 mcg Cap capsule Take 145 mcg by mouth.      oxybutynin (DITROPAN-XL) 10 MG 24 hr tablet Take 10 mg by mouth.      rosuvastatin (CRESTOR) 10 MG tablet Take 10 mg by mouth.      calcium carbonate-vitamin D3 (CALCIUM 600 WITH VITAMIN D3) 600 mg-10 mcg (400 unit) Chew Take 1 tablet by mouth once daily.      coenzyme Q10 200 mg capsule Take 200 mg by mouth.      polyethylene glycol (GLYCOLAX) 17 gram/dose powder Take 17 g by mouth.      red yeast rice 600 mg Cap Take 1,200 mg by mouth.       No current facility-administered medications on file prior to visit.     Health Maintenance   Topic Date Due    High Dose Statin  Never done    DEXA Scan  03/25/2025    Lipid Panel  07/16/2026    TETANUS VACCINE  08/18/2031    Hepatitis C Screening  Completed      Results for orders placed or performed in visit on 05/12/22    DEXA SCAN   Result Value Ref Range    BMD  Recommendation External No follow-up frequency specified     MAMMOGRAPHY   Result Value Ref Range    BCS Recommendation External Repeat mammogram in 1 year     COLONOSCOPY   Result Value Ref Range    CRC Recommendation External Repeat colonoscopy in 5 years           Assessment & Plan:     Active Problem List with Overview Notes    Diagnosis Date Noted    Medicare annual wellness visit, subsequent 07/25/2022    Postmenopausal 07/25/2022    History of breast cancer 07/25/2022    Overactive bladder 07/25/2022    Arthritis of knee 07/25/2022    Arteriosclerosis of coronary artery 07/25/2022    Chronic constipation 07/25/2022    Gastroesophageal reflux disease 07/25/2022    Hyperlipidemia 07/25/2022    Hypertension 07/25/2022    Hypothyroidism 07/25/2022    Osteopenia 07/25/2022    Tetanus, diphtheria, and acellular pertussis (Tdap) vaccination declined 07/25/2022    Vitamin D deficiency 07/25/2022    Advanced care planning/counseling discussion 07/25/2022       1. Medicare annual wellness visit, subsequent  Assessment & Plan:  Fasting labs ordered. Will call with results when available  mmg 3/2022  dexa 3/2022  Colonoscopy 8/2021 with dr. ramon  Pap with gyn- dr. blake   Declines immunizations    Orders:  -     CBC Auto Differential  -     Comprehensive Metabolic Panel  -     Lipid Panel  -     TSH  -     T4, Free  -     T3, Free ([facility])  -     Urinalysis, Reflex to Urine Culture Urine, Clean Catch  -     Vitamin D    2. Advanced care planning/counseling discussion  Assessment & Plan:  Advanced care planning discussed and paperwork given.    I attest that I have had a face to face discussion with patient and or surrogate decision maker.   Included surrogate decision maker: NO  Advanced directive in chart: NO  LAPOST: NO    Total time spent: 16 minutes      Orders:  -     CBC Auto Differential  -     Comprehensive Metabolic Panel  -     Lipid Panel  -     TSH  -     T4, Free  -     T3, Free ([facility])  -      Urinalysis, Reflex to Urine Culture Urine, Clean Catch  -     Vitamin D    3. Arthritis of knee  Assessment & Plan:  Keep appointments with ortho    Orders:  -     CBC Auto Differential  -     Comprehensive Metabolic Panel  -     Lipid Panel  -     TSH  -     T4, Free  -     T3, Free ([facility])  -     Urinalysis, Reflex to Urine Culture Urine, Clean Catch  -     Vitamin D    4. Osteopenia, unspecified location  Assessment & Plan:  On ca and vit d. Repeat dexa due 3/2024. Encouraged weight bearing exercises    Orders:  -     CBC Auto Differential  -     Comprehensive Metabolic Panel  -     Lipid Panel  -     TSH  -     T4, Free  -     T3, Free ([facility])  -     Urinalysis, Reflex to Urine Culture Urine, Clean Catch  -     Vitamin D    5. Chronic constipation  Assessment & Plan:  Stable on linzess and miralax    Orders:  -     CBC Auto Differential  -     Comprehensive Metabolic Panel  -     Lipid Panel  -     TSH  -     T4, Free  -     T3, Free ([facility])  -     Urinalysis, Reflex to Urine Culture Urine, Clean Catch  -     Vitamin D    6. Gastroesophageal reflux disease, unspecified whether esophagitis present  Assessment & Plan:  Stable on ppi    Orders:  -     CBC Auto Differential  -     Comprehensive Metabolic Panel  -     Lipid Panel  -     TSH  -     T4, Free  -     T3, Free ([facility])  -     Urinalysis, Reflex to Urine Culture Urine, Clean Catch  -     Vitamin D    7. Hypothyroidism, unspecified type  Assessment & Plan:  Stable on synthroid    Orders:  -     CBC Auto Differential  -     Comprehensive Metabolic Panel  -     Lipid Panel  -     TSH  -     T4, Free  -     T3, Free ([facility])  -     Urinalysis, Reflex to Urine Culture Urine, Clean Catch  -     Vitamin D    8. Vitamin D deficiency  Assessment & Plan:  Continue to supplement otc    Orders:  -     CBC Auto Differential  -     Comprehensive Metabolic Panel  -     Lipid Panel  -     TSH  -     T4, Free  -     T3, Free ([facility])  -     Urinalysis, Reflex to Urine Culture Urine,  Clean Catch  -     Vitamin D    9. History of breast cancer  Assessment & Plan:  Repeat mmg due 3/2024 at [facility]    Orders:  -     CBC Auto Differential  -     Comprehensive Metabolic Panel  -     Lipid Panel  -     TSH  -     T4, Free  -     T3, Free ([facility])  -     Urinalysis, Reflex to Urine Culture Urine, Clean Catch  -     Vitamin D    10. Overactive bladder  Assessment & Plan:  On oxybutynin. Keep appointments with dr. carreon    Orders:  -     CBC Auto Differential  -     Comprehensive Metabolic Panel  -     Lipid Panel  -     TSH  -     T4, Free  -     T3, Free ([facility])  -     Urinalysis, Reflex to Urine Culture Urine, Clean Catch  -     Vitamin D    11. Postmenopausal  Assessment & Plan:  See osteopenia A&P    Orders:  -     CBC Auto Differential  -     Comprehensive Metabolic Panel  -     Lipid Panel  -     TSH  -     T4, Free  -     T3, Free ([facility])  -     Urinalysis, Reflex to Urine Culture Urine, Clean Catch  -     Vitamin D    12. Arteriosclerosis of coronary artery  Assessment & Plan:  Stable on current meds. Keep appointments with dr. lepe    Orders:  -     CBC Auto Differential  -     Comprehensive Metabolic Panel  -     Lipid Panel  -     TSH  -     T4, Free  -     T3, Free ([facility])  -     Urinalysis, Reflex to Urine Culture Urine, Clean Catch  -     Vitamin D    13. Hyperlipidemia, unspecified hyperlipidemia type  Assessment & Plan:  On statin, RYR, and CoQ10.  Keep appointments with dr. lepe    Orders:  -     CBC Auto Differential  -     Comprehensive Metabolic Panel  -     Lipid Panel  -     TSH  -     T4, Free  -     T3, Free ([facility])  -     Urinalysis, Reflex to Urine Culture Urine, Clean Catch  -     Vitamin D    14. Hypertension, unspecified type  Assessment & Plan:  Well controlled on current meds. Keep appointments with cards    Orders:  -     CBC Auto Differential  -     Comprehensive Metabolic Panel  -     Lipid Panel  -     TSH  -     T4, Free  -     T3, Free ([facility])  -     Urinalysis, Reflex to Urine  Culture Urine, Clean Catch  -     Vitamin D       Follow up in about 1 year (around 7/25/2023) for wellness with labs.

## 2022-07-25 NOTE — ASSESSMENT & PLAN NOTE
Fasting labs ordered. Will call with results when available  mmg 3/2022  dexa 3/2022  Colonoscopy 8/2021 with dr. ramon  Pap with gyn- dr. blake   Declines immunizations

## 2022-09-15 ENCOUNTER — HISTORICAL (OUTPATIENT)
Dept: ADMINISTRATIVE | Facility: HOSPITAL | Age: 77
End: 2022-09-15
Payer: MEDICARE

## 2022-10-24 PROBLEM — Z00.00 MEDICARE ANNUAL WELLNESS VISIT, SUBSEQUENT: Status: RESOLVED | Noted: 2022-07-25 | Resolved: 2022-10-24

## 2023-01-18 ENCOUNTER — OFFICE VISIT (OUTPATIENT)
Dept: FAMILY MEDICINE | Facility: CLINIC | Age: 78
End: 2023-01-18
Payer: MEDICARE

## 2023-01-18 ENCOUNTER — LAB VISIT (OUTPATIENT)
Dept: LAB | Facility: HOSPITAL | Age: 78
End: 2023-01-18
Attending: FAMILY MEDICINE
Payer: MEDICARE

## 2023-01-18 ENCOUNTER — CLINICAL SUPPORT (OUTPATIENT)
Dept: RESPIRATORY THERAPY | Facility: HOSPITAL | Age: 78
End: 2023-01-18
Attending: FAMILY MEDICINE
Payer: MEDICARE

## 2023-01-18 VITALS
DIASTOLIC BLOOD PRESSURE: 76 MMHG | OXYGEN SATURATION: 98 % | RESPIRATION RATE: 16 BRPM | BODY MASS INDEX: 36.52 KG/M2 | SYSTOLIC BLOOD PRESSURE: 112 MMHG | TEMPERATURE: 98 F | HEART RATE: 76 BPM | HEIGHT: 59 IN | WEIGHT: 181.13 LBS

## 2023-01-18 DIAGNOSIS — Z01.818 PRE-OP EXAM: ICD-10-CM

## 2023-01-18 DIAGNOSIS — Z01.818 PRE-OP EXAM: Primary | ICD-10-CM

## 2023-01-18 DIAGNOSIS — I25.10 ARTERIOSCLEROSIS OF CORONARY ARTERY: ICD-10-CM

## 2023-01-18 DIAGNOSIS — I10 HYPERTENSION, UNSPECIFIED TYPE: ICD-10-CM

## 2023-01-18 DIAGNOSIS — E78.5 HYPERLIPIDEMIA, UNSPECIFIED HYPERLIPIDEMIA TYPE: ICD-10-CM

## 2023-01-18 DIAGNOSIS — E03.9 HYPOTHYROIDISM, UNSPECIFIED TYPE: ICD-10-CM

## 2023-01-18 DIAGNOSIS — E66.01 CLASS 2 SEVERE OBESITY DUE TO EXCESS CALORIES WITH SERIOUS COMORBIDITY AND BODY MASS INDEX (BMI) OF 36.0 TO 36.9 IN ADULT: ICD-10-CM

## 2023-01-18 PROBLEM — E66.812 CLASS 2 SEVERE OBESITY WITH SERIOUS COMORBIDITY AND BODY MASS INDEX (BMI) OF 36.0 TO 36.9 IN ADULT: Status: ACTIVE | Noted: 2023-01-18

## 2023-01-18 LAB
ALBUMIN SERPL-MCNC: 3.8 G/DL (ref 3.4–4.8)
ALBUMIN/GLOB SERPL: 1.4 RATIO (ref 1.1–2)
ALP SERPL-CCNC: 38 UNIT/L (ref 40–150)
ALT SERPL-CCNC: 8 UNIT/L (ref 0–55)
AST SERPL-CCNC: 15 UNIT/L (ref 5–34)
BASOPHILS # BLD AUTO: 0.03 X10(3)/MCL (ref 0–0.2)
BASOPHILS NFR BLD AUTO: 0.4 %
BILIRUBIN DIRECT+TOT PNL SERPL-MCNC: 0.5 MG/DL
BUN SERPL-MCNC: 16.9 MG/DL (ref 9.8–20.1)
CALCIUM SERPL-MCNC: 9.7 MG/DL (ref 8.4–10.2)
CHLORIDE SERPL-SCNC: 100 MMOL/L (ref 98–107)
CO2 SERPL-SCNC: 26 MMOL/L (ref 23–31)
CREAT SERPL-MCNC: 0.8 MG/DL (ref 0.55–1.02)
EOSINOPHIL # BLD AUTO: 0.26 X10(3)/MCL (ref 0–0.9)
EOSINOPHIL NFR BLD AUTO: 3.8 %
ERYTHROCYTE [DISTWIDTH] IN BLOOD BY AUTOMATED COUNT: 12.2 % (ref 11.5–17)
GFR SERPLBLD CREATININE-BSD FMLA CKD-EPI: >60 MLS/MIN/1.73/M2
GLOBULIN SER-MCNC: 2.7 GM/DL (ref 2.4–3.5)
GLUCOSE SERPL-MCNC: 98 MG/DL (ref 82–115)
HCT VFR BLD AUTO: 37.7 % (ref 37–47)
HGB BLD-MCNC: 12.9 GM/DL (ref 12–16)
IMM GRANULOCYTES # BLD AUTO: 0.02 X10(3)/MCL (ref 0–0.04)
IMM GRANULOCYTES NFR BLD AUTO: 0.3 %
LYMPHOCYTES # BLD AUTO: 1.55 X10(3)/MCL (ref 0.6–4.6)
LYMPHOCYTES NFR BLD AUTO: 22.4 %
MCH RBC QN AUTO: 32.7 PG
MCHC RBC AUTO-ENTMCNC: 34.2 MG/DL (ref 33–36)
MCV RBC AUTO: 95.4 FL (ref 80–94)
MONOCYTES # BLD AUTO: 0.58 X10(3)/MCL (ref 0.1–1.3)
MONOCYTES NFR BLD AUTO: 8.4 %
NEUTROPHILS # BLD AUTO: 4.47 X10(3)/MCL (ref 2.1–9.2)
NEUTROPHILS NFR BLD AUTO: 64.7 %
NRBC BLD AUTO-RTO: 0 %
PLATELET # BLD AUTO: 307 X10(3)/MCL (ref 130–400)
PMV BLD AUTO: 9 FL (ref 7.4–10.4)
POTASSIUM SERPL-SCNC: 4.6 MMOL/L (ref 3.5–5.1)
PROT SERPL-MCNC: 6.5 GM/DL (ref 5.8–7.6)
RBC # BLD AUTO: 3.95 X10(6)/MCL (ref 4.2–5.4)
SODIUM SERPL-SCNC: 136 MMOL/L (ref 136–145)
WBC # SPEC AUTO: 6.9 X10(3)/MCL (ref 4.5–11.5)

## 2023-01-18 PROCEDURE — 93010 EKG 12-LEAD: ICD-10-PCS | Mod: ,,, | Performed by: INTERNAL MEDICINE

## 2023-01-18 PROCEDURE — 99214 PR OFFICE/OUTPT VISIT, EST, LEVL IV, 30-39 MIN: ICD-10-PCS | Mod: ,,, | Performed by: FAMILY MEDICINE

## 2023-01-18 PROCEDURE — 80053 COMPREHEN METABOLIC PANEL: CPT

## 2023-01-18 PROCEDURE — 93010 ELECTROCARDIOGRAM REPORT: CPT | Mod: ,,, | Performed by: INTERNAL MEDICINE

## 2023-01-18 PROCEDURE — 99214 OFFICE O/P EST MOD 30 MIN: CPT | Mod: ,,, | Performed by: FAMILY MEDICINE

## 2023-01-18 PROCEDURE — 93005 ELECTROCARDIOGRAM TRACING: CPT

## 2023-01-18 PROCEDURE — 36415 COLL VENOUS BLD VENIPUNCTURE: CPT

## 2023-01-18 PROCEDURE — 85025 COMPLETE CBC W/AUTO DIFF WBC: CPT

## 2023-01-18 RX ORDER — ALENDRONATE SODIUM 70 MG/1
70 TABLET ORAL
COMMUNITY
Start: 2022-03-28 | End: 2023-08-03 | Stop reason: SDUPTHER

## 2023-01-18 RX ORDER — ESTRADIOL 0.1 MG/G
0.5 CREAM VAGINAL
COMMUNITY
Start: 2022-08-17 | End: 2023-08-17

## 2023-01-18 RX ORDER — SOLIFENACIN SUCCINATE 5 MG/1
5 TABLET, FILM COATED ORAL
COMMUNITY
Start: 2022-08-18 | End: 2023-08-03

## 2023-01-18 NOTE — PROGRESS NOTES
Subjective:        Patient ID: Mary Lou Grajeda is a 77 y.o. female.    Chief Complaint: Pre-op Exam (Sx clearance right eye- cataract 1/24/2023)       presents to the clinic unaccompanied for pre op exam.  She is due for her wellness visit in July.    She is scheduled for right cataract with dr. Rhodes on 1/24/23. Does not have plans to do left.  She is not on any blood thinners.  She denies any chest pain or shortness of breath.  She can vacuum a room without chest pain or shortness of breath.  She denies any problems with anesthesia in the past.  The patient does have a history of hypertension, hyperlipidemia, carotid artery stenosis and CAD.  She states she was told she did not need Cardiology clearance.              The patient had her right hip replacement done on November 27, 2018, with Dr. Bal. She also had a right radial head fx from fall 2/2021. saw dr. travis. did not need surgery.     The patient has hypertension, HLD, carotid artery stenosis, and CAD and is compliant with her medications which include amlodipine, benazepril and asa, Crestor, CoQ10 and red yeast rice. She denies any chest pain shortness of breath or weakness. Dr. Younger is her cardiologist. lov 7/2022. carotid us with him 7/2022.       She has hypothyroidism and is compliant with her Synthroid.      She has constipation and is on linzess 2x/week. Has GERD and is on nexium. GI is dr. ramon. doing some pelvic floor PT at [facility].  Doing pilates now and is enjoying.      she has OAB and on oxybuytnin. she does not take daily. she also has a pessary and has seen dr. carreon lov 12/2021.  He retired.  She has appointment with eve CHENEY in 8/2022.     Her mammograms were ordered by Dr. Hassan due to her history of breast cancer which was dx in 1994 and treated with chemo and radiation as well as tamoxifen x 5 years. Her last appt with her was 2/2019. She discharged her and has her to return only prn. Last mmg at [facility] 3/2022 was normal. copy in  "her chart. She ordered her DEXA scans as well. Last 3/2022. She does them at [facility] on [address]. She has osteopenia and is on calcium and vitamin d. she also reports a history of low vit d. Her Pap smears are done with her GYN, Dr. Manjarrez. She has appointment soon 9/2022. Her last colonoscopy was done in 8/2021 with Dr. Fine. recommended repeat in 5 years. copy in chart.     Is ALLERGIC to Compazine and Darvon. She drinks alcohol rarely. She does not smoke. completed covid series. utd on pneumovax and tdap. Got shingrix at pharmacy          Review of Systems   Constitutional: Negative.    HENT: Negative.     Respiratory: Negative.  Negative for shortness of breath.    Cardiovascular: Negative.  Negative for chest pain.   Gastrointestinal: Negative.    Genitourinary: Negative.        Review of patient's allergies indicates:   Allergen Reactions    Prochlorperazine     Propoxyphene-asa-caffeine     Propoxyphene Nausea Only      Vitals:    01/18/23 1115   BP: 112/76   BP Location: Left arm   Pulse: 76   Resp: 16   Temp: 97.9 °F (36.6 °C)   TempSrc: Temporal   SpO2: 98%   Weight: 82.1 kg (181 lb 1.6 oz)   Height: 4' 11" (1.499 m)      Social History     Socioeconomic History    Marital status:    Tobacco Use    Smoking status: Never    Smokeless tobacco: Never   Social History Narrative    ** Merged History Encounter **           History reviewed. No pertinent family history.       Objective:     Physical Exam  Vitals and nursing note reviewed.   Constitutional:       Appearance: Normal appearance. She is obese.   HENT:      Head: Normocephalic and atraumatic.      Nose: Nose normal.      Mouth/Throat:      Mouth: Mucous membranes are moist.      Pharynx: Oropharynx is clear.   Eyes:      Extraocular Movements: Extraocular movements intact.   Cardiovascular:      Rate and Rhythm: Normal rate and regular rhythm.      Pulses: Normal pulses.      Heart sounds: Normal heart sounds.   Pulmonary:      Effort: Pulmonary " effort is normal.      Breath sounds: Normal breath sounds.   Musculoskeletal:         General: Normal range of motion.      Cervical back: Normal range of motion.   Skin:     General: Skin is warm and dry.   Neurological:      General: No focal deficit present.      Mental Status: She is alert and oriented to person, place, and time. Mental status is at baseline.   Psychiatric:         Mood and Affect: Mood normal.     Current Outpatient Medications on File Prior to Visit   Medication Sig Dispense Refill    alendronate (FOSAMAX) 70 MG tablet Take 70 mg by mouth.      estradioL (ESTRACE) 0.01 % (0.1 mg/gram) vaginal cream Place 0.5 g vaginally.      amLODIPine (NORVASC) 10 MG tablet Take 10 mg by mouth.      benazepriL (LOTENSIN) 20 MG tablet Take 20 mg by mouth.      calcium carbonate-vitamin D3 (CALCIUM 600 WITH VITAMIN D3) 600 mg-10 mcg (400 unit) Chew Take 1 tablet by mouth once daily.      coenzyme Q10 200 mg capsule Take 200 mg by mouth.      esomeprazole (NEXIUM) 40 MG capsule Take 40 mg by mouth.      levothyroxine (SYNTHROID) 75 MCG tablet Take 125 mcg by mouth.      linaCLOtide (LINZESS) 145 mcg Cap capsule Take 145 mcg by mouth.      oxybutynin (DITROPAN-XL) 10 MG 24 hr tablet Take 10 mg by mouth.      polyethylene glycol (GLYCOLAX) 17 gram/dose powder Take 17 g by mouth.      red yeast rice 600 mg Cap Take 1,200 mg by mouth.      rosuvastatin (CRESTOR) 10 MG tablet Take 10 mg by mouth.      solifenacin (VESICARE) 5 MG tablet Take 5 mg by mouth.       No current facility-administered medications on file prior to visit.     Health Maintenance   Topic Date Due    Aspirin/Antiplatelet Therapy  Never done    High Dose Statin  07/25/2023    DEXA Scan  03/25/2025    Lipid Panel  07/16/2026    TETANUS VACCINE  08/18/2031    Hepatitis C Screening  Completed      Results for orders placed or performed in visit on 09/15/22   BUN   Result Value Ref Range    Blood Urea Nitrogen 14 7 - 18 mg/dL   Creatinine, Serum    Result Value Ref Range    Creatinine 0.77 0.60 - 1.30 mg/dL   Glucose, Random   Result Value Ref Range    Glucose Level 94 74 - 106 mg/dL          Assessment & Plan:     Active Problem List with Overview Notes    Diagnosis Date Noted    Pre-op exam 01/18/2023    Class 2 severe obesity with serious comorbidity and body mass index (BMI) of 36.0 to 36.9 in adult 01/18/2023    Postmenopausal 07/25/2022    History of breast cancer 07/25/2022    Overactive bladder 07/25/2022    Arthritis of knee 07/25/2022    Arteriosclerosis of coronary artery 07/25/2022    Chronic constipation 07/25/2022    Gastroesophageal reflux disease 07/25/2022    Hyperlipidemia 07/25/2022    Hypertension 07/25/2022    Hypothyroidism 07/25/2022    Osteopenia 07/25/2022    Tetanus, diphtheria, and acellular pertussis (Tdap) vaccination declined 07/25/2022    Vitamin D deficiency 07/25/2022    Advanced care planning/counseling discussion 07/25/2022       1. Pre-op exam  Assessment & Plan:  Will get labs and ekg today. Will call with results when available and complete her pre op paperwork once reviewed.      Orders:  -     SCHEDULED EKG 12-LEAD (to Muse); Future  -     CBC Auto Differential; Future; Expected date: 01/18/2023  -     Comprehensive Metabolic Panel; Future; Expected date: 01/18/2023    2. Hypertension, unspecified type  Assessment & Plan:  Well controlled on current meds. Keep appointments with cards    Orders:  -     SCHEDULED EKG 12-LEAD (to Muse); Future  -     CBC Auto Differential; Future; Expected date: 01/18/2023  -     Comprehensive Metabolic Panel; Future; Expected date: 01/18/2023    3. Hyperlipidemia, unspecified hyperlipidemia type  Assessment & Plan:  On statin, RYR, and CoQ10.  Keep appointments with dr. lepe    Orders:  -     SCHEDULED EKG 12-LEAD (to Muse); Future  -     CBC Auto Differential; Future; Expected date: 01/18/2023  -     Comprehensive Metabolic Panel; Future; Expected date: 01/18/2023    4. Arteriosclerosis  of coronary artery  Assessment & Plan:  Stable on current meds. Keep appointments with dr. lepe    Orders:  -     SCHEDULED EKG 12-LEAD (to Muse); Future  -     CBC Auto Differential; Future; Expected date: 01/18/2023  -     Comprehensive Metabolic Panel; Future; Expected date: 01/18/2023    5. Hypothyroidism, unspecified type  Assessment & Plan:  Stable on synthroid  Lab Results   Component Value Date    TSH 0.6588 07/15/2020         Orders:  -     SCHEDULED EKG 12-LEAD (to Muse); Future  -     CBC Auto Differential; Future; Expected date: 01/18/2023  -     Comprehensive Metabolic Panel; Future; Expected date: 01/18/2023    6. Class 2 severe obesity due to excess calories with serious comorbidity and body mass index (BMI) of 36.0 to 36.9 in adult  Assessment & Plan:  Encourage lifestyle change           Keep scheduled wellness 7/2023 with labs or sooner prn

## 2023-01-18 NOTE — ASSESSMENT & PLAN NOTE
Will get labs and ekg today. Will call with results when available and complete her pre op paperwork once reviewed.

## 2023-01-18 NOTE — PROGRESS NOTES
Please inform patient of results.    1. Cbc and cmp are wnl.  Waiting on ekg and then will complete pre op paperwork

## 2023-01-20 NOTE — PROGRESS NOTES
Please inform patient of results.    1. Ekg shows nsr.  Will print and attach to her pre op paperwork in folder. Please scan and fax to surgeon office.     Other labwork within acceptable ranges.

## 2023-02-27 RX ORDER — LEVOTHYROXINE SODIUM 75 UG/1
75 TABLET ORAL
Qty: 90 TABLET | Refills: 3 | Status: SHIPPED | OUTPATIENT
Start: 2023-02-27 | End: 2023-12-04 | Stop reason: SDUPTHER

## 2023-02-28 ENCOUNTER — OFFICE VISIT (OUTPATIENT)
Dept: FAMILY MEDICINE | Facility: CLINIC | Age: 78
End: 2023-02-28
Payer: MEDICARE

## 2023-02-28 VITALS
WEIGHT: 181.69 LBS | RESPIRATION RATE: 16 BRPM | HEIGHT: 59 IN | OXYGEN SATURATION: 98 % | SYSTOLIC BLOOD PRESSURE: 116 MMHG | DIASTOLIC BLOOD PRESSURE: 84 MMHG | HEART RATE: 64 BPM | BODY MASS INDEX: 36.63 KG/M2 | TEMPERATURE: 98 F

## 2023-02-28 DIAGNOSIS — K21.9 GASTROESOPHAGEAL REFLUX DISEASE, UNSPECIFIED WHETHER ESOPHAGITIS PRESENT: ICD-10-CM

## 2023-02-28 DIAGNOSIS — I10 PRIMARY HYPERTENSION: ICD-10-CM

## 2023-02-28 DIAGNOSIS — R06.02 SHORTNESS OF BREATH: Primary | ICD-10-CM

## 2023-02-28 PROBLEM — Z09 HOSPITAL DISCHARGE FOLLOW-UP: Status: ACTIVE | Noted: 2023-02-28

## 2023-02-28 PROBLEM — Z09 HOSPITAL DISCHARGE FOLLOW-UP: Status: RESOLVED | Noted: 2023-02-28 | Resolved: 2023-02-28

## 2023-02-28 PROCEDURE — 99214 OFFICE O/P EST MOD 30 MIN: CPT | Mod: ,,, | Performed by: FAMILY MEDICINE

## 2023-02-28 PROCEDURE — 99214 PR OFFICE/OUTPT VISIT, EST, LEVL IV, 30-39 MIN: ICD-10-PCS | Mod: ,,, | Performed by: FAMILY MEDICINE

## 2023-02-28 RX ORDER — ALBUTEROL SULFATE 90 UG/1
2 AEROSOL, METERED RESPIRATORY (INHALATION) EVERY 4 HOURS PRN
COMMUNITY
Start: 2023-02-22 | End: 2023-02-28 | Stop reason: SDUPTHER

## 2023-02-28 RX ORDER — AMLODIPINE BESYLATE 10 MG/1
10 TABLET ORAL DAILY
Qty: 90 TABLET | Refills: 3 | Status: SHIPPED | OUTPATIENT
Start: 2023-02-28 | End: 2024-02-23

## 2023-02-28 RX ORDER — BENAZEPRIL HYDROCHLORIDE 20 MG/1
20 TABLET ORAL DAILY
Qty: 90 TABLET | Refills: 3 | Status: SHIPPED | OUTPATIENT
Start: 2023-02-28 | End: 2023-12-04 | Stop reason: SDUPTHER

## 2023-02-28 RX ORDER — ALBUTEROL SULFATE 90 UG/1
2 AEROSOL, METERED RESPIRATORY (INHALATION) EVERY 4 HOURS PRN
COMMUNITY
Start: 2023-02-22 | End: 2023-03-01

## 2023-02-28 RX ORDER — ESOMEPRAZOLE MAGNESIUM 40 MG/1
40 CAPSULE, DELAYED RELEASE ORAL
Qty: 90 CAPSULE | Refills: 3 | Status: SHIPPED | OUTPATIENT
Start: 2023-02-28 | End: 2023-09-11 | Stop reason: SDUPTHER

## 2023-02-28 RX ORDER — ALBUTEROL SULFATE 90 UG/1
2 AEROSOL, METERED RESPIRATORY (INHALATION) EVERY 4 HOURS PRN
Qty: 18 G | Refills: 11 | Status: SHIPPED | OUTPATIENT
Start: 2023-02-28

## 2023-02-28 RX ORDER — METHYLPREDNISOLONE 4 MG/1
TABLET ORAL
COMMUNITY
Start: 2023-02-22 | End: 2023-02-28

## 2023-02-28 NOTE — PROGRESS NOTES
Subjective:        Patient ID: Mary Lou Grajeda is a 77 y.o. female.    Chief Complaint: Follow-up (Hosp follow up, 2/22/22 [facility] acute bronchospasm /Needs refill of abluterol inhaler )      presents to the clinic unaccompanied for ER follow up.  She is due for her wellness visit in July.    Went to [facility] Er on 2/22/23 for SOB/NORT[place] after vacuuming some thick carpet the day before. EKG, labs, cxr were all normal. Given neb tx in ER.  [place] better. Dx with bronchospasm.  Sent home with oral steroids and albuterol inhaler.  She completed steroids today.  Reports history of asthma as a child.  No recent allergy flare.   Albuterol inhaler worked well when she had. Ran out about 3 days ago. Asking for refill. Will send in to pharmacy. No allergy symptoms. No chest pain.  Recently saw dr. Younger and all was good.     Needs refills of bp meds and ppi.           She had right cataract with dr. Rhodes on 1/24/23.       The patient had her right hip replacement done on November 27, 2018, with Dr. Bal. She also had a right radial head fx from fall 2/2021. saw dr. travis. did not need surgery.     The patient has hypertension, HLD, carotid artery stenosis, and CAD and is compliant with her medications which include amlodipine, benazepril and asa, Crestor, CoQ10 and red yeast rice. She denies any chest pain shortness of breath or weakness. Dr. Younger is her cardiologist. carotid us with him 7/2022.       She has hypothyroidism and is compliant with her Synthroid.      She has constipation and is on linzess 2x/week. Has GERD and is on nexium. GI is dr. ramon. doing some pelvic floor PT at [facility].  Doing pilates now and is enjoying.      she has OAB and on oxybuytnin. she does not take daily. she also has a pessary and has seen dr. velma del angel 12/2021.  He retired.  She has appointment with new MD in 8/2022.    Had covid 7/2022.  End of July had freezer that leaked freon caused her to have chest pain.      Her mammograms were ordered by  "Dr. Hassan due to her history of breast cancer which was dx in 1994 and treated with chemo and radiation as well as tamoxifen x 5 years. Her last appt with her was 2/2019. She discharged her and has her to return only prn. Last mmg at [facility] 3/2022 was normal. copy in her chart. She ordered her DEXA scans as well. Last 3/2022. She does them at [facility] on [address]. She has osteopenia and is on calcium and vitamin d. she also reports a history of low vit d. Her Pap smears are done with her GYN, Dr. Manjarrez. She has appointment soon 9/2022. Her last colonoscopy was done in 8/2021 with Dr. Fine. recommended repeat in 5 years. copy in chart.     Is ALLERGIC to Compazine and Darvon. She drinks alcohol rarely. She does not smoke. completed covid series. utd on pneumovax and tdap. Got shingrix at pharmacy    Review of Systems   Constitutional: Negative.    HENT: Negative.     Respiratory:  Positive for shortness of breath. Negative for apnea, cough, choking and chest tightness.    Cardiovascular: Negative.    Gastrointestinal: Negative.    Genitourinary: Negative.        Review of patient's allergies indicates:   Allergen Reactions    Prochlorperazine     Propoxyphene-asa-caffeine     Propoxyphene Nausea Only      Vitals:    02/28/23 1426   BP: 116/84   BP Location: Left arm   Pulse: 64   Resp: 16   Temp: 98.1 °F (36.7 °C)   TempSrc: Temporal   SpO2: 98%   Weight: 82.4 kg (181 lb 11.2 oz)   Height: 4' 11" (1.499 m)      Social History     Socioeconomic History    Marital status:    Tobacco Use    Smoking status: Never    Smokeless tobacco: Never   Social History Narrative    ** Merged History Encounter **           History reviewed. No pertinent family history.       Objective:     Physical Exam  Vitals and nursing note reviewed.   Constitutional:       Appearance: Normal appearance. She is obese.   HENT:      Head: Normocephalic and atraumatic.      Nose: Nose normal.      Mouth/Throat:      Mouth: Mucous membranes are " moist.      Pharynx: Oropharynx is clear.   Eyes:      Extraocular Movements: Extraocular movements intact.   Cardiovascular:      Rate and Rhythm: Normal rate and regular rhythm.      Pulses: Normal pulses.      Heart sounds: Normal heart sounds.   Pulmonary:      Effort: Pulmonary effort is normal. No respiratory distress.      Breath sounds: Normal breath sounds. No stridor. No wheezing, rhonchi or rales.   Chest:      Chest wall: No tenderness.   Musculoskeletal:         General: Normal range of motion.      Cervical back: Normal range of motion.   Skin:     General: Skin is warm and dry.   Neurological:      General: No focal deficit present.      Mental Status: She is alert and oriented to person, place, and time. Mental status is at baseline.   Psychiatric:         Mood and Affect: Mood normal.     Current Outpatient Medications on File Prior to Visit   Medication Sig Dispense Refill    albuterol (PROVENTIL/VENTOLIN HFA) 90 mcg/actuation inhaler Inhale 2 puffs into the lungs every 4 (four) hours as needed.      alendronate (FOSAMAX) 70 MG tablet Take 70 mg by mouth.      calcium carbonate-vitamin D3 (CALCIUM 600 WITH VITAMIN D3) 600 mg-10 mcg (400 unit) Chew Take 1 tablet by mouth once daily.      coenzyme Q10 200 mg capsule Take 200 mg by mouth.      estradioL (ESTRACE) 0.01 % (0.1 mg/gram) vaginal cream Place 0.5 g vaginally.      levothyroxine (SYNTHROID) 75 MCG tablet Take 1 tablet (75 mcg total) by mouth before breakfast. 90 tablet 3    oxybutynin (DITROPAN-XL) 10 MG 24 hr tablet Take 10 mg by mouth.      polyethylene glycol (GLYCOLAX) 17 gram/dose powder Take 17 g by mouth.      red yeast rice 600 mg Cap Take 1,200 mg by mouth.      rosuvastatin (CRESTOR) 10 MG tablet Take 10 mg by mouth.      solifenacin (VESICARE) 5 MG tablet Take 5 mg by mouth.      [DISCONTINUED] albuterol (PROVENTIL/VENTOLIN HFA) 90 mcg/actuation inhaler Inhale 2 puffs into the lungs every 4 (four) hours as needed.       [DISCONTINUED] amLODIPine (NORVASC) 10 MG tablet Take 10 mg by mouth.      [DISCONTINUED] benazepriL (LOTENSIN) 20 MG tablet Take 20 mg by mouth.      [DISCONTINUED] esomeprazole (NEXIUM) 40 MG capsule Take 40 mg by mouth.      [DISCONTINUED] methylPREDNISolone (MEDROL DOSEPACK) 4 mg tablet FOLLOW PACKAGE DIRECTIONS       No current facility-administered medications on file prior to visit.     Health Maintenance   Topic Date Due    Aspirin/Antiplatelet Therapy  Never done    High Dose Statin  07/25/2023    DEXA Scan  03/25/2025    Lipid Panel  07/16/2026    TETANUS VACCINE  08/18/2031    Hepatitis C Screening  Completed      Results for orders placed or performed in visit on 01/18/23   Comprehensive Metabolic Panel   Result Value Ref Range    Sodium Level 136 136 - 145 mmol/L    Potassium Level 4.6 3.5 - 5.1 mmol/L    Chloride 100 98 - 107 mmol/L    Carbon Dioxide 26 23 - 31 mmol/L    Glucose Level 98 82 - 115 mg/dL    Blood Urea Nitrogen 16.9 9.8 - 20.1 mg/dL    Creatinine 0.80 0.55 - 1.02 mg/dL    Calcium Level Total 9.7 8.4 - 10.2 mg/dL    Protein Total 6.5 5.8 - 7.6 gm/dL    Albumin Level 3.8 3.4 - 4.8 g/dL    Globulin 2.7 2.4 - 3.5 gm/dL    Albumin/Globulin Ratio 1.4 1.1 - 2.0 ratio    Bilirubin Total 0.5 <=1.5 mg/dL    Alkaline Phosphatase 38 (L) 40 - 150 unit/L    Alanine Aminotransferase 8 0 - 55 unit/L    Aspartate Aminotransferase 15 5 - 34 unit/L    eGFR >60 mls/min/1.73/m2   CBC with Differential   Result Value Ref Range    WBC 6.9 4.5 - 11.5 x10(3)/mcL    RBC 3.95 (L) 4.20 - 5.40 x10(6)/mcL    Hgb 12.9 12.0 - 16.0 gm/dL    Hct 37.7 37.0 - 47.0 %    MCV 95.4 (H) 80.0 - 94.0 fL    MCH 32.7 pg    MCHC 34.2 33.0 - 36.0 mg/dL    RDW 12.2 11.5 - 17.0 %    Platelet 307 130 - 400 x10(3)/mcL    MPV 9.0 7.4 - 10.4 fL    Neut % 64.7 %    Lymph % 22.4 %    Mono % 8.4 %    Eos % 3.8 %    Basophil % 0.4 %    Lymph # 1.55 0.6 - 4.6 x10(3)/mcL    Neut # 4.47 2.1 - 9.2 x10(3)/mcL    Mono # 0.58 0.1 - 1.3 x10(3)/mcL     Eos # 0.26 0 - 0.9 x10(3)/mcL    Baso # 0.03 0 - 0.2 x10(3)/mcL    IG# 0.02 0 - 0.04 x10(3)/mcL    IG% 0.3 %    NRBC% 0.0 %          Assessment & Plan:     Active Problem List with Overview Notes    Diagnosis Date Noted    Shortness of breath 02/28/2023    Pre-op exam 01/18/2023    Class 2 severe obesity with serious comorbidity and body mass index (BMI) of 36.0 to 36.9 in adult 01/18/2023    Postmenopausal 07/25/2022    History of breast cancer 07/25/2022    Overactive bladder 07/25/2022    Arthritis of knee 07/25/2022    Arteriosclerosis of coronary artery 07/25/2022    Chronic constipation 07/25/2022    Gastroesophageal reflux disease 07/25/2022    Hyperlipidemia 07/25/2022    Hypertension 07/25/2022    Hypothyroidism 07/25/2022    Osteopenia 07/25/2022    Tetanus, diphtheria, and acellular pertussis (Tdap) vaccination declined 07/25/2022    Vitamin D deficiency 07/25/2022    Advanced care planning/counseling discussion 07/25/2022       1. Shortness of breath  Assessment & Plan:  [facility] ER visit note reviewed.  Completed meds.  Will get PFTs.  Will call with results when available. Refill of inhaler sent in.  Discussed on use.  Contact clinic for concerns. Patient is agreeable to plan and verbalized understanding    Orders:  -     Complete PFT w/ bronchodilator; Future    2. Primary hypertension  Assessment & Plan:  Well controlled on current prescription meds. Keep appointments with cards. Refills sent in as requested    Orders:  -     amLODIPine (NORVASC) 10 MG tablet; Take 1 tablet (10 mg total) by mouth once daily.  Dispense: 90 tablet; Refill: 3  -     benazepriL (LOTENSIN) 20 MG tablet; Take 1 tablet (20 mg total) by mouth once daily.  Dispense: 90 tablet; Refill: 3    3. Gastroesophageal reflux disease, unspecified whether esophagitis present  Assessment & Plan:  Stable on ppi. Refill sent in as requested    Orders:  -     esomeprazole (NEXIUM) 40 MG capsule; Take 1 capsule (40 mg total) by mouth before  breakfast.  Dispense: 90 capsule; Refill: 3    Other orders  -     albuterol (PROVENTIL/VENTOLIN HFA) 90 mcg/actuation inhaler; Inhale 2 puffs into the lungs every 4 (four) hours as needed for Wheezing or Shortness of Breath.  Dispense: 18 g; Refill: 11         Keep scheduled appt  7/2023 for wellness with labs

## 2023-02-28 NOTE — ASSESSMENT & PLAN NOTE
[facility] ER visit note reviewed.  Completed meds.  Will get PFTs.  Will call with results when available. Refill of inhaler sent in.  Discussed on use.  Contact clinic for concerns. Patient is agreeable to plan and verbalized understanding

## 2023-02-28 NOTE — ASSESSMENT & PLAN NOTE
Well controlled on current prescription meds. Keep appointments with cards. Refills sent in as requested

## 2023-03-07 ENCOUNTER — TELEPHONE (OUTPATIENT)
Dept: FAMILY MEDICINE | Facility: CLINIC | Age: 78
End: 2023-03-07
Payer: MEDICARE

## 2023-03-07 DIAGNOSIS — R06.02 SHORTNESS OF BREATH: Primary | ICD-10-CM

## 2023-03-07 NOTE — TELEPHONE ENCOUNTER
----- Message from Jonathan Mancini sent at 3/7/2023 11:45 AM CST -----  .Type:  Needs Medical Advice    Who Called: pt  Would the patient rather a call back or a response via [facility]? Call back  Best Call Back Number: [phone]  Additional Information: pt is calling about a lung test

## 2023-03-07 NOTE — TELEPHONE ENCOUNTER
I have signed for the following orders AND/OR meds.  Please call the patient and ask the patient to schedule the testing AND/OR inform about any medications that were sent.      Orders Placed This Encounter   Procedures    Complete PFT w/ bronchodilator     Standing Status:   Future     Standing Expiration Date:   3/7/2024     Order Specific Question:   Release to patient     Answer:   Immediate

## 2023-03-07 NOTE — TELEPHONE ENCOUNTER
Patient called to let us know that PFT was never scheduled. I see it was ordered as clinic performed by mistake. Can you reorder as not clinic performed

## 2023-03-07 NOTE — TELEPHONE ENCOUNTER
----- Message from Jonathan Mancini sent at 3/7/2023 11:45 AM CST -----  .Type:  Needs Medical Advice    Who Called: pt  Would the patient rather a call back or a response via [facility]? Call back  Best Call Back Number: [phone]  Additional Information: pt is calling about a lung test

## 2023-03-21 ENCOUNTER — PROCEDURE VISIT (OUTPATIENT)
Dept: RESPIRATORY THERAPY | Facility: HOSPITAL | Age: 78
End: 2023-03-21
Attending: FAMILY MEDICINE
Payer: MEDICARE

## 2023-03-21 DIAGNOSIS — R06.02 SHORTNESS OF BREATH: ICD-10-CM

## 2023-03-21 PROCEDURE — 94727 GAS DIL/WSHOT DETER LNG VOL: CPT

## 2023-03-21 PROCEDURE — 94010 BREATHING CAPACITY TEST: CPT

## 2023-03-21 PROCEDURE — 94729 DIFFUSING CAPACITY: CPT

## 2023-03-29 LAB — BCS RECOMMENDATION EXT: NORMAL

## 2023-03-30 ENCOUNTER — DOCUMENTATION ONLY (OUTPATIENT)
Dept: FAMILY MEDICINE | Facility: CLINIC | Age: 78
End: 2023-03-30
Payer: MEDICARE

## 2023-03-31 ENCOUNTER — TELEPHONE (OUTPATIENT)
Dept: FAMILY MEDICINE | Facility: CLINIC | Age: 78
End: 2023-03-31
Payer: MEDICARE

## 2023-07-24 ENCOUNTER — LAB VISIT (OUTPATIENT)
Dept: LAB | Facility: HOSPITAL | Age: 78
End: 2023-07-24
Attending: FAMILY MEDICINE
Payer: MEDICARE

## 2023-07-24 DIAGNOSIS — K21.9 GASTROESOPHAGEAL REFLUX DISEASE, UNSPECIFIED WHETHER ESOPHAGITIS PRESENT: ICD-10-CM

## 2023-07-24 DIAGNOSIS — Z85.3 HISTORY OF BREAST CANCER: ICD-10-CM

## 2023-07-24 DIAGNOSIS — Z78.0 POSTMENOPAUSAL: ICD-10-CM

## 2023-07-24 DIAGNOSIS — Z00.00 MEDICARE ANNUAL WELLNESS VISIT, SUBSEQUENT: ICD-10-CM

## 2023-07-24 DIAGNOSIS — E78.5 HYPERLIPIDEMIA, UNSPECIFIED HYPERLIPIDEMIA TYPE: ICD-10-CM

## 2023-07-24 DIAGNOSIS — E03.9 HYPOTHYROIDISM, UNSPECIFIED TYPE: ICD-10-CM

## 2023-07-24 DIAGNOSIS — E55.9 VITAMIN D DEFICIENCY: ICD-10-CM

## 2023-07-24 DIAGNOSIS — I10 HYPERTENSION, UNSPECIFIED TYPE: ICD-10-CM

## 2023-07-24 DIAGNOSIS — N32.81 OVERACTIVE BLADDER: ICD-10-CM

## 2023-07-24 DIAGNOSIS — K59.09 CHRONIC CONSTIPATION: ICD-10-CM

## 2023-07-24 DIAGNOSIS — I25.10 ARTERIOSCLEROSIS OF CORONARY ARTERY: ICD-10-CM

## 2023-07-24 DIAGNOSIS — M85.80 OSTEOPENIA, UNSPECIFIED LOCATION: ICD-10-CM

## 2023-07-24 DIAGNOSIS — M17.10 ARTHRITIS OF KNEE: ICD-10-CM

## 2023-07-24 DIAGNOSIS — Z71.89 ADVANCED CARE PLANNING/COUNSELING DISCUSSION: ICD-10-CM

## 2023-07-24 LAB
APPEARANCE UR: ABNORMAL
BACTERIA #/AREA URNS AUTO: ABNORMAL /HPF
BILIRUB UR QL STRIP.AUTO: NEGATIVE
COLOR UR: ABNORMAL
GLUCOSE UR QL STRIP.AUTO: NEGATIVE
KETONES UR QL STRIP.AUTO: NEGATIVE
LEUKOCYTE ESTERASE UR QL STRIP.AUTO: ABNORMAL
NITRITE UR QL STRIP.AUTO: NEGATIVE
PH UR STRIP.AUTO: 7 [PH]
PROT UR QL STRIP.AUTO: NEGATIVE
RBC #/AREA URNS AUTO: ABNORMAL /HPF
RBC UR QL AUTO: ABNORMAL
SP GR UR STRIP.AUTO: 1.01
SQUAMOUS #/AREA URNS AUTO: ABNORMAL /HPF
UROBILINOGEN UR STRIP-ACNC: 0.2
WBC #/AREA URNS AUTO: ABNORMAL /HPF

## 2023-07-24 PROCEDURE — 87077 CULTURE AEROBIC IDENTIFY: CPT

## 2023-07-24 PROCEDURE — 81001 URINALYSIS AUTO W/SCOPE: CPT

## 2023-07-24 NOTE — PROGRESS NOTES
Please inform patient of results.    1. Ua shows bacteria and wbc.  Culture is pending.  Is she having any uti symptoms? Encourage fluids. Contact clinic for concerns.

## 2023-07-26 ENCOUNTER — OFFICE VISIT (OUTPATIENT)
Dept: FAMILY MEDICINE | Facility: CLINIC | Age: 78
End: 2023-07-26
Payer: MEDICARE

## 2023-07-26 VITALS
RESPIRATION RATE: 16 BRPM | SYSTOLIC BLOOD PRESSURE: 108 MMHG | WEIGHT: 182.38 LBS | BODY MASS INDEX: 36.77 KG/M2 | DIASTOLIC BLOOD PRESSURE: 62 MMHG | OXYGEN SATURATION: 99 % | TEMPERATURE: 98 F | HEIGHT: 59 IN | HEART RATE: 79 BPM

## 2023-07-26 DIAGNOSIS — N32.81 OVERACTIVE BLADDER: ICD-10-CM

## 2023-07-26 DIAGNOSIS — E03.9 HYPOTHYROIDISM, UNSPECIFIED TYPE: ICD-10-CM

## 2023-07-26 DIAGNOSIS — K21.9 GASTROESOPHAGEAL REFLUX DISEASE, UNSPECIFIED WHETHER ESOPHAGITIS PRESENT: ICD-10-CM

## 2023-07-26 DIAGNOSIS — K59.09 CHRONIC CONSTIPATION: ICD-10-CM

## 2023-07-26 DIAGNOSIS — Z78.0 POSTMENOPAUSAL: ICD-10-CM

## 2023-07-26 DIAGNOSIS — Z00.00 MEDICARE ANNUAL WELLNESS VISIT, SUBSEQUENT: Primary | ICD-10-CM

## 2023-07-26 DIAGNOSIS — N39.0 URINARY TRACT INFECTION WITHOUT HEMATURIA, SITE UNSPECIFIED: ICD-10-CM

## 2023-07-26 DIAGNOSIS — I25.10 ARTERIOSCLEROSIS OF CORONARY ARTERY: ICD-10-CM

## 2023-07-26 DIAGNOSIS — E55.9 VITAMIN D DEFICIENCY: ICD-10-CM

## 2023-07-26 DIAGNOSIS — I10 PRIMARY HYPERTENSION: ICD-10-CM

## 2023-07-26 DIAGNOSIS — E78.2 MIXED HYPERLIPIDEMIA: ICD-10-CM

## 2023-07-26 DIAGNOSIS — M85.80 OSTEOPENIA, UNSPECIFIED LOCATION: ICD-10-CM

## 2023-07-26 DIAGNOSIS — Z71.89 ADVANCED CARE PLANNING/COUNSELING DISCUSSION: ICD-10-CM

## 2023-07-26 DIAGNOSIS — E66.01 CLASS 2 SEVERE OBESITY DUE TO EXCESS CALORIES WITH SERIOUS COMORBIDITY AND BODY MASS INDEX (BMI) OF 36.0 TO 36.9 IN ADULT: ICD-10-CM

## 2023-07-26 PROBLEM — Z01.818 PRE-OP EXAM: Status: RESOLVED | Noted: 2023-01-18 | Resolved: 2023-07-26

## 2023-07-26 LAB — BACTERIA UR CULT: ABNORMAL

## 2023-07-26 PROCEDURE — G0439 PR MEDICARE ANNUAL WELLNESS SUBSEQUENT VISIT: ICD-10-PCS | Mod: ,,, | Performed by: FAMILY MEDICINE

## 2023-07-26 PROCEDURE — G0439 PPPS, SUBSEQ VISIT: HCPCS | Mod: ,,, | Performed by: FAMILY MEDICINE

## 2023-07-26 RX ORDER — ASPIRIN 81 MG/1
81 TABLET ORAL DAILY
COMMUNITY

## 2023-07-26 RX ORDER — LINACLOTIDE 145 UG/1
145 CAPSULE, GELATIN COATED ORAL
COMMUNITY
Start: 2023-07-18

## 2023-07-26 RX ORDER — NITROFURANTOIN 25; 75 MG/1; MG/1
100 CAPSULE ORAL 2 TIMES DAILY
Qty: 20 CAPSULE | Refills: 0 | Status: SHIPPED | OUTPATIENT
Start: 2023-07-26 | End: 2023-07-26 | Stop reason: SDUPTHER

## 2023-07-26 RX ORDER — NITROFURANTOIN 25; 75 MG/1; MG/1
100 CAPSULE ORAL 2 TIMES DAILY
Qty: 20 CAPSULE | Refills: 0 | Status: SHIPPED | OUTPATIENT
Start: 2023-07-26 | End: 2023-08-05

## 2023-07-26 NOTE — ASSESSMENT & PLAN NOTE
Previously done labs reviewed with patient at time of appt today  mmg 3/2023 at [facility]  dexa 3/2022 showed osteopenia. Repeat due 3/2024  Pap 9/2022 with gyn- dr. blake  Colonoscopy with dr ramon 8/2021 with 5 year repeat    Advanced care planning discussed and paperwork given

## 2023-07-26 NOTE — PROGRESS NOTES
Patient ID: [id number]     Chief Complaint: Medicare AWV (Wellness )      HPI:     Mary Lou Grajeda is a 78 y.o. female here today for a Medicare Wellness. No other complaints today.     presents to the clinic unaccompanied for her wellness visit. She did labs prior to her appt and it was reviewed with her at time of appt today. Labs showed uti. Macrobid was sent in this am but did not . Would like sent to [facility] instead.  Has had some itching.     Concerned for memory problems.  Is on prevagen or neuriva.  Not interested in seeing neuro.   No one has mentioned concerns of her memory to her.       The patient has hypertension, HLD, carotid artery stenosis, and CAD and is compliant with her medications which include asa, Crestor, CoQ10 and red yeast rice. Has not been taking norvasc and benazepril depending on her day.  Bp today is on no meds. She denies any chest pain shortness of breath or weakness. Dr. Younger is her cardiologist. carotid us with him 7/2022.   has appt with him 9/2023.      She has hypothyroidism and is compliant with her Synthroid.      She has constipation and is on linzess 3x/week. Has GERD and is on nexium. GI is dr. ramon. doing some pelvic floor PT at [facility].       She is obese     she has OAB and on vesicare.  she also has a pessary and has seen dr. carreon lov 12/2021.  He retired.  She has appointment with new MD- dr. Salas- in 8/2022.     Had covid 7/2022.  End of July had freezer that leaked freon caused her to have chest pain.      Her mammograms were ordered by Dr. Hassan due to her history of breast cancer which was dx in 1994 and treated with chemo and radiation as well as tamoxifen x 5 years. Her last appt with her was 2/2019. She discharged her and has her to return only prn. Last mmg at [facility] 3/2023 was normal. She ordered her DEXA scans as well. Last 3/2022. She does them at [facility] on [address]. She has osteopenia and is on calcium and vitamin d. she also reports a history  of low vit d. Her Pap smears are done with her GYN, Dr. Manjarrez. Saw her 9/2022. Her last colonoscopy was done in 8/2021 with Dr. Fine. recommended repeat in 5 years. copy in chart.    She had right cataract with dr. Rhodes on 1/24/23.       The patient had her right hip replacement done on November 27, 2018, with Dr. Bal. She also had a right radial head fx from fall 2/2021. saw dr. travis. did not need surgery.        Is ALLERGIC to Compazine and Darvon. She drinks alcohol rarely. She does not smoke. completed covid series. utd on pneumovax and tdap. Got shingrix at pharmacy      Past Surgical History:   Procedure Laterality Date    COLONOSCOPY  08/24/2021    Andra Fine III, MD       Review of patient's allergies indicates:   Allergen Reactions    Prochlorperazine     Propoxyphene-asa-caffeine     Propoxyphene Nausea Only       Outpatient Medications Marked as Taking for the 7/26/23 encounter (Office Visit) with Anitha Avila MD   Medication Sig Dispense Refill    aspirin (ECOTRIN) 81 MG EC tablet Take 81 mg by mouth once daily.      B6/folic/B12/coffee/phosphatid (NEURIVA PLUS BRAIN PERFORMANCE ORAL) Take by mouth.         Social History     Socioeconomic History    Marital status:    Tobacco Use    Smoking status: Never    Smokeless tobacco: Never   Social History Narrative    ** Merged History Encounter **             History reviewed. No pertinent family history.     Patient Care Team:  Anitha Avila MD as PCP - General (Family Medicine)  MD Andra Welsh III, MD as Consulting Physician (Gastroenterology)  Franc Bal MD as Consulting Physician (Orthopedic Surgery)  Jan Younger MD as Consulting Physician (Cardiology)  Anitha Manjarrez MD as Obstetrician (Obstetrics)       Subjective:     Review of Systems   Constitutional: Negative.    HENT: Negative.     Eyes: Negative.    Respiratory: Negative.     Cardiovascular: Negative.    Gastrointestinal:  Negative.    Genitourinary: Negative.    Musculoskeletal: Negative.    Skin: Negative.    Neurological: Negative.    Endo/Heme/Allergies: Negative.    Psychiatric/Behavioral: Negative.         Patient Reported Health Risk Assessment  What [age] 70-79  Are you male or female?: Female  During the past four weeks, how much have you been bothered by emotional problems such as feeling anxious, depressed, irritable, sad, or downhearted and blue?: Not at all  During the past five weeks, has your physical and/or emotional health limited your social activities with family, friends, neighbors, or groups?: Not at all  During the past four weeks, how much bodily pain have you generally had?: Mild pain  During the past four weeks, was someone available to help if you needed and wanted help?: Yes, as much as I wanted  During the past four weeks, what was the hardest physical activity you could do for at least two minutes?: Light  Can you get to places out of walking distance without help?  (For example, can you travel alone on buses or taxis, or drive your own car?): Yes  Can you go shopping for groceries or clothes without someone's help?: Yes  Can you prepare your own meals?: Yes  Can you do your own housework without help?: Yes  Because of any health problems, do you need the help of another person with your personal care needs such as eating, bathing, dressing, or getting around the house?: No  Can you handle your own money without help?: Yes  During the past four weeks, how would you rate your health in general?: Good  How have things been going for you during the past four weeks?: Good and bad parts about equal  Are you having difficulties driving your car?: No  Do you always fasten your seat belt when you are in a car?: Yes, usually  How often in the past four weeks have you been bothered by falling or dizzy when standing up?: Never  How often in the past four weeks have you been bothered by sexual problems?:  "Never  How often in the past four weeks have you been bothered by trouble eating well?: Never  How often in the past four weeks have you been bothered by teeth or denture problems?: Never  How often in the past four weeks have you been bothered with problems using the telephone?: Never  How often in the past four weeks have you been bothered by tiredness or fatigue?: Never  Have you fallen two or more times in the past year?: No  Are you afraid of falling?: No  Are you a smoker?: No  During the past four weeks, how many drinks of wine, beer, or other alcoholic beverages did you have?: No alcohol at all  Do you exercise for about 20 minutes three or more days a week?: Yes, some of the time  Have you been given any information to help you with hazards in your house that might hurt you?: No  Have you been given any information to help you with keeping track of your medications?: No  How often do you have trouble taking medicines the way you've been told to take them?: I always take them as prescribed  How confident are you that you can control and manage most of your health problems?: Very confident  What is your race? (Check all that apply.):     Objective:     /62 (BP Location: Left arm)   Pulse 79   Temp 98.2 °F (36.8 °C) (Temporal)   Resp 16   Ht 4' 11" (1.499 m)   Wt 82.7 kg (182 lb 6.4 oz)   SpO2 99%   BMI 36.84 kg/m²     Physical Exam  Vitals and nursing note reviewed.   Constitutional:       Appearance: Normal appearance. She is obese.   HENT:      Head: Normocephalic and atraumatic.      Nose: Nose normal.      Mouth/Throat:      Mouth: Mucous membranes are moist.      Pharynx: Oropharynx is clear.   Eyes:      Extraocular Movements: Extraocular movements intact.   Cardiovascular:      Rate and Rhythm: Normal rate and regular rhythm.      Pulses: Normal pulses.      Heart sounds: Normal heart sounds.   Pulmonary:      Effort: Pulmonary effort is normal.      Breath sounds: Normal breath " sounds.   Musculoskeletal:         General: Normal range of motion.      Cervical back: Normal range of motion.   Skin:     General: Skin is warm and dry.   Neurological:      General: No focal deficit present.      Mental Status: She is alert and oriented to person, place, and time. Mental status is at baseline.   Psychiatric:         Mood and Affect: Mood normal.         No flowsheet data found.  Fall Risk Assessment - Outpatient 7/26/2023 2/28/2023 1/18/2023 7/25/2022   Mobility Status Ambulatory Ambulatory Ambulatory Ambulatory   Number of falls 0 0 0 0   Identified as fall risk 0 0 0 0           Depression Screening  Over the past two weeks, has the patient felt down, depressed, or hopeless?: No  Over the past two weeks, has the patient felt little interest or pleasure in doing things?: No  Functional Ability/Safety Screening  Was the patient's timed Up & Go test unsteady or longer than 30 seconds?: No  Does the patient need help with phone, transportation, shopping, preparing meals, housework, laundry, meds, or managing money?: No  Does the patient's home have rugs in the hallway, lack grab bars in the bathroom, lack handrails on the stairs or have poor lighting?: No  Have you noticed any hearing difficulties?: No  Cognitive Function (Assessed through direct observation with due consideration of information obtained by way of patient reports and/or concerns raised by family, friends, caretakers, or others)    Does the patient repeat questions/statements in the same day?: No  Does the patient have trouble remembering the date, year, and time?: No  Does the patient have difficulty managing finances?: No  Does the patient have a decreased sense of direction?: No  Assessment/Plan:       Medicare Annual Wellness and Personalized Prevention Plan:   Fall Risk + Home Safety + Hearing Impairment + Depression Screen + Cognitive Impairment Screen + Health Risk Assessment all reviewed.     Opioid Screening: Patient  medication list reviewed, patient is not taking prescription opioids. Patient is not using additional opioids than prescribed. Patient is at low risk of substance abuse based on this opioid use history.         Health Maintenance Topics with due status: Not Due       Topic Last Completion Date    Lipid Panel 07/16/2021    TETANUS VACCINE 08/18/2021    DEXA Scan 03/25/2022    Influenza Vaccine 10/20/2022    Aspirin/Antiplatelet Therapy 07/26/2023      The patient's Health Maintenance was reviewed and the following appears to be due at this time:   Health Maintenance Due   Topic Date Due    COVID-19 Vaccine (5 - Pfizer series) 12/29/2022         1. Medicare annual wellness visit, subsequent  Assessment & Plan:  Previously done labs reviewed with patient at time of appt today  mmg 3/2023 at [facility]  dexa 3/2022 showed osteopenia. Repeat due 3/2024  Pap 9/2022 with gyn- dr. blake  Colonoscopy with dr ramon 8/2021 with 5 year repeat    Advanced care planning discussed and paperwork given    Orders:  -     CBC Auto Differential; Future; Expected date: 07/26/2024  -     Comprehensive Metabolic Panel; Future; Expected date: 07/26/2024  -     Lipid Panel; Future; Expected date: 07/26/2024  -     TSH; Future; Expected date: 07/26/2024  -     Urinalysis; Future; Expected date: 07/26/2024  -     Vitamin D; Future; Expected date: 07/26/2024    2. Advanced care planning/counseling discussion  Assessment & Plan:  Advanced care planning discussed and paperwork given.    I attest that I have had a face to face discussion with patient and or surrogate decision maker.   Included surrogate decision maker: NO  Advanced directive in chart: NO  LAPOST: NO    Total time spent: 16 minutes      Orders:  -     CBC Auto Differential; Future; Expected date: 07/26/2024  -     Comprehensive Metabolic Panel; Future; Expected date: 07/26/2024  -     Lipid Panel; Future; Expected date: 07/26/2024  -     TSH; Future; Expected date: 07/26/2024  -      Urinalysis; Future; Expected date: 07/26/2024  -     Vitamin D; Future; Expected date: 07/26/2024    3. Primary hypertension  Assessment & Plan:  Well controlled on current meds. Keep appointments with cards    Orders:  -     CBC Auto Differential; Future; Expected date: 07/26/2024  -     Comprehensive Metabolic Panel; Future; Expected date: 07/26/2024  -     Lipid Panel; Future; Expected date: 07/26/2024  -     TSH; Future; Expected date: 07/26/2024  -     Urinalysis; Future; Expected date: 07/26/2024  -     Vitamin D; Future; Expected date: 07/26/2024    4. Mixed hyperlipidemia  Assessment & Plan:  On statin, RYR, and CoQ10.  Keep appointments with dr. lepe    Orders:  -     CBC Auto Differential; Future; Expected date: 07/26/2024  -     Comprehensive Metabolic Panel; Future; Expected date: 07/26/2024  -     Lipid Panel; Future; Expected date: 07/26/2024  -     TSH; Future; Expected date: 07/26/2024  -     Urinalysis; Future; Expected date: 07/26/2024  -     Vitamin D; Future; Expected date: 07/26/2024    5. Arteriosclerosis of coronary artery  Assessment & Plan:  Stable on current meds. Keep appointments with dr. lepe    Orders:  -     CBC Auto Differential; Future; Expected date: 07/26/2024  -     Comprehensive Metabolic Panel; Future; Expected date: 07/26/2024  -     Lipid Panel; Future; Expected date: 07/26/2024  -     TSH; Future; Expected date: 07/26/2024  -     Urinalysis; Future; Expected date: 07/26/2024  -     Vitamin D; Future; Expected date: 07/26/2024    6. Postmenopausal  Assessment & Plan:  On ca and vit d. Repeat dexa due 3/2024. Encouraged weight bearing exercises    Orders:  -     CBC Auto Differential; Future; Expected date: 07/26/2024  -     Comprehensive Metabolic Panel; Future; Expected date: 07/26/2024  -     Lipid Panel; Future; Expected date: 07/26/2024  -     TSH; Future; Expected date: 07/26/2024  -     Urinalysis; Future; Expected date: 07/26/2024  -     Vitamin D; Future; Expected  date: 07/26/2024    7. Overactive bladder  Assessment & Plan:  On vesicare. Keep appointments with dr. madsen    Orders:  -     CBC Auto Differential; Future; Expected date: 07/26/2024  -     Comprehensive Metabolic Panel; Future; Expected date: 07/26/2024  -     Lipid Panel; Future; Expected date: 07/26/2024  -     TSH; Future; Expected date: 07/26/2024  -     Urinalysis; Future; Expected date: 07/26/2024  -     Vitamin D; Future; Expected date: 07/26/2024    8. Hypothyroidism, unspecified type  Assessment & Plan:  Stable on synthroid  Lab Results   Component Value Date    TSH 0.365 07/24/2023         Orders:  -     CBC Auto Differential; Future; Expected date: 07/26/2024  -     Comprehensive Metabolic Panel; Future; Expected date: 07/26/2024  -     Lipid Panel; Future; Expected date: 07/26/2024  -     TSH; Future; Expected date: 07/26/2024  -     Urinalysis; Future; Expected date: 07/26/2024  -     Vitamin D; Future; Expected date: 07/26/2024    9. Vitamin D deficiency  Assessment & Plan:  Continue to supplement otc    Orders:  -     CBC Auto Differential; Future; Expected date: 07/26/2024  -     Comprehensive Metabolic Panel; Future; Expected date: 07/26/2024  -     Lipid Panel; Future; Expected date: 07/26/2024  -     TSH; Future; Expected date: 07/26/2024  -     Urinalysis; Future; Expected date: 07/26/2024  -     Vitamin D; Future; Expected date: 07/26/2024    10. Chronic constipation  Assessment & Plan:  Stable on linzess and miralax    Orders:  -     CBC Auto Differential; Future; Expected date: 07/26/2024  -     Comprehensive Metabolic Panel; Future; Expected date: 07/26/2024  -     Lipid Panel; Future; Expected date: 07/26/2024  -     TSH; Future; Expected date: 07/26/2024  -     Urinalysis; Future; Expected date: 07/26/2024  -     Vitamin D; Future; Expected date: 07/26/2024    11. Gastroesophageal reflux disease, unspecified whether esophagitis present  Assessment & Plan:  Stable on ppi.     Orders:  -      CBC Auto Differential; Future; Expected date: 07/26/2024  -     Comprehensive Metabolic Panel; Future; Expected date: 07/26/2024  -     Lipid Panel; Future; Expected date: 07/26/2024  -     TSH; Future; Expected date: 07/26/2024  -     Urinalysis; Future; Expected date: 07/26/2024  -     Vitamin D; Future; Expected date: 07/26/2024    12. Osteopenia, unspecified location  Assessment & Plan:  See postmenopausal A&P    Orders:  -     CBC Auto Differential; Future; Expected date: 07/26/2024  -     Comprehensive Metabolic Panel; Future; Expected date: 07/26/2024  -     Lipid Panel; Future; Expected date: 07/26/2024  -     TSH; Future; Expected date: 07/26/2024  -     Urinalysis; Future; Expected date: 07/26/2024  -     Vitamin D; Future; Expected date: 07/26/2024    13. Urinary tract infection without hematuria, site unspecified  Assessment & Plan:  Per wellness labs 7/2023. macrobid sent in. Take as directed. Encourage fluids. Contact clinic for concerns.     Orders:  -     CBC Auto Differential; Future; Expected date: 07/26/2024  -     Comprehensive Metabolic Panel; Future; Expected date: 07/26/2024  -     Lipid Panel; Future; Expected date: 07/26/2024  -     TSH; Future; Expected date: 07/26/2024  -     Urinalysis; Future; Expected date: 07/26/2024  -     Vitamin D; Future; Expected date: 07/26/2024    14. Class 2 severe obesity due to excess calories with serious comorbidity and body mass index (BMI) of 36.0 to 36.9 in adult  Assessment & Plan:  Encouraged lifestyle change    Orders:  -     CBC Auto Differential; Future; Expected date: 07/26/2024  -     Comprehensive Metabolic Panel; Future; Expected date: 07/26/2024  -     Lipid Panel; Future; Expected date: 07/26/2024  -     TSH; Future; Expected date: 07/26/2024  -     Urinalysis; Future; Expected date: 07/26/2024  -     Vitamin D; Future; Expected date: 07/26/2024    Other orders  -     nitrofurantoin, macrocrystal-monohydrate, (MACROBID) 100 MG capsule; Take  1 capsule (100 mg total) by mouth 2 (two) times daily. for 10 days  Dispense: 20 capsule; Refill: 0         Advance Care Planning   I attest to discussing Advance Care Planning with patient and/or family member.  Education was provided including the importance of the Health Care Power of , Advance Directives, and/or [place] documentation.  The patient expressed understanding to the importance of this information and discussion.  Length of ACP conversation in minutes: 16         Medication List with Changes/Refills   Current Medications    ALBUTEROL (PROVENTIL/VENTOLIN HFA) 90 MCG/ACTUATION INHALER    Inhale 2 puffs into the lungs every 4 (four) hours as needed for Wheezing or Shortness of Breath.       Start Date: 2/28/2023 End Date: --    ALENDRONATE (FOSAMAX) 70 MG TABLET    Take 70 mg by mouth.       Start Date: 3/28/2022 End Date: --    AMLODIPINE (NORVASC) 10 MG TABLET    Take 1 tablet (10 mg total) by mouth once daily.       Start Date: 2/28/2023 End Date: 2/23/2024    ASPIRIN (ECOTRIN) 81 MG EC TABLET    Take 81 mg by mouth once daily.       Start Date: --        End Date: --    B6/FOLIC/B12/COFFEE/PHOSPHATID (NEURIVA PLUS BRAIN PERFORMANCE ORAL)    Take by mouth.       Start Date: --        End Date: --    BENAZEPRIL (LOTENSIN) 20 MG TABLET    Take 1 tablet (20 mg total) by mouth once daily.       Start Date: 2/28/2023 End Date: 2/23/2024    CALCIUM CARBONATE-VITAMIN D3 (CALCIUM 600 WITH VITAMIN D3) 600 MG-10 MCG (400 UNIT) CHEW    Take 1 tablet by mouth once daily.       Start Date: --        End Date: --    COENZYME Q10 200 MG CAPSULE    Take 200 mg by mouth.       Start Date: --        End Date: --    ESOMEPRAZOLE (NEXIUM) 40 MG CAPSULE    Take 1 capsule (40 mg total) by mouth before breakfast.       Start Date: 2/28/2023 End Date: 2/28/2024    ESTRADIOL (ESTRACE) 0.01 % (0.1 MG/GRAM) VAGINAL CREAM    Place 0.5 g vaginally.       Start Date: 8/17/2022 End Date: 8/17/2023    LEVOTHYROXINE  (SYNTHROID) 75 MCG TABLET    Take 1 tablet (75 mcg total) by mouth before breakfast.       Start Date: 2/27/2023 End Date: --    LINZESS 145 MCG CAP CAPSULE    Take 145 mcg by mouth.       Start Date: 7/18/2023 End Date: --    POLYETHYLENE GLYCOL (GLYCOLAX) 17 GRAM/DOSE POWDER    Take 17 g by mouth.       Start Date: --        End Date: --    RED YEAST RICE 600 MG CAP    Take 1,200 mg by mouth once daily.       Start Date: --        End Date: --    ROSUVASTATIN (CRESTOR) 10 MG TABLET    Take 10 mg by mouth.       Start Date: 1/24/2022 End Date: --    SOLIFENACIN (VESICARE) 5 MG TABLET    Take 5 mg by mouth.       Start Date: 8/18/2022 End Date: --   Changed and/or Refilled Medications    Modified Medication Previous Medication    NITROFURANTOIN, MACROCRYSTAL-MONOHYDRATE, (MACROBID) 100 MG CAPSULE nitrofurantoin, macrocrystal-monohydrate, (MACROBID) 100 MG capsule       Take 1 capsule (100 mg total) by mouth 2 (two) times daily. for 10 days    Take 1 capsule (100 mg total) by mouth 2 (two) times daily. for 10 days       Start Date: 7/26/2023 End Date: 8/5/2023    Start Date: 7/26/2023 End Date: 7/26/2023   Discontinued Medications    OXYBUTYNIN (DITROPAN-XL) 10 MG 24 HR TABLET    Take 10 mg by mouth.       Start Date: 1/24/2022 End Date: 7/26/2023        Follow up in about 6 months (around 1/26/2024) for chronic conditions. In addition to their scheduled follow up, the patient has also been instructed to follow up on as needed basis.

## 2023-07-26 NOTE — ASSESSMENT & PLAN NOTE
Per wellness labs 7/2023. macrobid sent in. Take as directed. Encourage fluids. Contact clinic for concerns.

## 2023-07-26 NOTE — PROGRESS NOTES
Please inform patient of results. Keep scheduled appt today. Can discuss further at visit    1. Urine shows concern for infection. I will send in antibiotics. Take as directed. Encourage fluids. Contact clinic for concerns.     Other labwork within acceptable ranges.

## 2023-08-03 RX ORDER — ALENDRONATE SODIUM 70 MG/1
70 TABLET ORAL
Qty: 12 TABLET | Refills: 3 | Status: SHIPPED | OUTPATIENT
Start: 2023-08-03 | End: 2024-08-02

## 2023-08-03 RX ORDER — SOLIFENACIN SUCCINATE 5 MG/1
10 TABLET, FILM COATED ORAL DAILY
Qty: 180 TABLET | Refills: 3 | Status: SHIPPED | OUTPATIENT
Start: 2023-08-03 | End: 2023-11-07 | Stop reason: SDUPTHER

## 2023-08-21 ENCOUNTER — LAB VISIT (OUTPATIENT)
Dept: LAB | Facility: HOSPITAL | Age: 78
End: 2023-08-21
Attending: INTERNAL MEDICINE
Payer: MEDICARE

## 2023-08-21 DIAGNOSIS — R06.09 DYSPNEA ON EXERTION: ICD-10-CM

## 2023-08-21 DIAGNOSIS — Z79.899 ENCOUNTER FOR LONG-TERM (CURRENT) USE OF OTHER MEDICATIONS: Primary | ICD-10-CM

## 2023-08-21 LAB
ALBUMIN SERPL-MCNC: 3.9 G/DL (ref 3.4–4.8)
ALBUMIN/GLOB SERPL: 1.6 RATIO (ref 1.1–2)
ALP SERPL-CCNC: 45 UNIT/L (ref 40–150)
ALT SERPL-CCNC: 25 UNIT/L (ref 0–55)
AST SERPL-CCNC: 23 UNIT/L (ref 5–34)
BILIRUB SERPL-MCNC: 0.4 MG/DL
BUN SERPL-MCNC: 10.9 MG/DL (ref 9.8–20.1)
CALCIUM SERPL-MCNC: 9.7 MG/DL (ref 8.4–10.2)
CHLORIDE SERPL-SCNC: 99 MMOL/L (ref 98–107)
CHOLEST SERPL-MCNC: 174 MG/DL
CHOLEST/HDLC SERPL: 3 {RATIO} (ref 0–5)
CO2 SERPL-SCNC: 28 MMOL/L (ref 23–31)
CREAT SERPL-MCNC: 0.84 MG/DL (ref 0.55–1.02)
GFR SERPLBLD CREATININE-BSD FMLA CKD-EPI: >60 MLS/MIN/1.73/M2
GLOBULIN SER-MCNC: 2.5 GM/DL (ref 2.4–3.5)
GLUCOSE SERPL-MCNC: 106 MG/DL (ref 82–115)
HDLC SERPL-MCNC: 58 MG/DL (ref 35–60)
LDLC SERPL CALC-MCNC: 99 MG/DL (ref 50–140)
POTASSIUM SERPL-SCNC: 4.4 MMOL/L (ref 3.5–5.1)
PROT SERPL-MCNC: 6.4 GM/DL (ref 5.8–7.6)
SODIUM SERPL-SCNC: 134 MMOL/L (ref 136–145)
TRIGL SERPL-MCNC: 83 MG/DL (ref 37–140)
VLDLC SERPL CALC-MCNC: 17 MG/DL

## 2023-08-21 PROCEDURE — 80061 LIPID PANEL: CPT

## 2023-08-21 PROCEDURE — 36415 COLL VENOUS BLD VENIPUNCTURE: CPT

## 2023-08-21 PROCEDURE — 80053 COMPREHEN METABOLIC PANEL: CPT

## 2023-09-11 DIAGNOSIS — K21.9 GASTROESOPHAGEAL REFLUX DISEASE, UNSPECIFIED WHETHER ESOPHAGITIS PRESENT: ICD-10-CM

## 2023-09-11 NOTE — TELEPHONE ENCOUNTER
----- Message from Jonathan Mancini sent at 9/11/2023  3:39 PM CDT -----  .Type:  RX Refill Request    Who Called: pt  Refill or New Rx:Refill  RX Name and Strength:esomeprazole (NEXIUM) 40 MG capsule  How is the patient currently taking it? (ex. 1XDay):Route: Take 1 capsule (40 mg total) by mouth before breakfast. - Oral  Is this a 30 day or 90 day RX:90 day  Preferred Pharmacy with phone number:[facility] - [address]  Local or Mail Order:local  Ordering Provider:  Would the patient rather a call back or a response via [facility]? Call back  Best Call Back Number:  Additional Information:

## 2023-09-12 RX ORDER — ESOMEPRAZOLE MAGNESIUM 40 MG/1
40 CAPSULE, DELAYED RELEASE ORAL
Qty: 90 CAPSULE | Refills: 3 | Status: SHIPPED | OUTPATIENT
Start: 2023-09-12 | End: 2024-09-11

## 2023-10-30 PROBLEM — Z00.00 MEDICARE ANNUAL WELLNESS VISIT, SUBSEQUENT: Status: RESOLVED | Noted: 2022-07-25 | Resolved: 2023-10-30

## 2023-10-30 PROBLEM — N39.0 UTI (URINARY TRACT INFECTION): Status: RESOLVED | Noted: 2023-07-26 | Resolved: 2023-10-30

## 2023-11-07 ENCOUNTER — TELEPHONE (OUTPATIENT)
Dept: FAMILY MEDICINE | Facility: CLINIC | Age: 78
End: 2023-11-07
Payer: MEDICARE

## 2023-11-07 DIAGNOSIS — N32.81 OVERACTIVE BLADDER: Primary | ICD-10-CM

## 2023-11-07 RX ORDER — SOLIFENACIN SUCCINATE 10 MG/1
10 TABLET, FILM COATED ORAL DAILY
Qty: 90 TABLET | Refills: 3 | Status: SHIPPED | OUTPATIENT
Start: 2023-11-07 | End: 2024-11-06

## 2023-11-07 NOTE — TELEPHONE ENCOUNTER
I have signed for the following orders AND/OR meds.  Please call the patient and ask the patient to schedule the testing AND/OR inform about any medications that were sent.        Medications Ordered This Encounter   Medications    solifenacin (VESICARE) 10 MG tablet     Sig: Take 1 tablet (10 mg total) by mouth once daily.     Dispense:  90 tablet     Refill:  3

## 2023-11-07 NOTE — TELEPHONE ENCOUNTER
Patient's pharmacy faxed notification that insurance will no longer pay for rx solifenacin two mg tabs daily. They would like to know if you'd like to decrease to 5 mg daily, or one 10 mg tab daily? Please advise

## 2023-12-04 DIAGNOSIS — I10 PRIMARY HYPERTENSION: ICD-10-CM

## 2023-12-04 RX ORDER — ROSUVASTATIN CALCIUM 10 MG/1
10 TABLET, COATED ORAL DAILY
Qty: 90 TABLET | Refills: 3 | Status: SHIPPED | OUTPATIENT
Start: 2023-12-04

## 2023-12-04 RX ORDER — BENAZEPRIL HYDROCHLORIDE 20 MG/1
20 TABLET ORAL DAILY
Qty: 90 TABLET | Refills: 3 | Status: SHIPPED | OUTPATIENT
Start: 2023-12-04 | End: 2024-11-28

## 2023-12-04 RX ORDER — LEVOTHYROXINE SODIUM 75 UG/1
75 TABLET ORAL
Qty: 90 TABLET | Refills: 3 | Status: SHIPPED | OUTPATIENT
Start: 2023-12-04

## 2023-12-04 NOTE — TELEPHONE ENCOUNTER
----- Message from Patsy Soriano sent at 12/4/2023  3:34 PM CST -----  Regarding: med refill  .Type:  RX Refill Request    Who Called: pt  Refill or New Rx:refill  RX Name and Strength:benazepriL (LOTENSIN) 20 MG tablet  How is the patient currently taking it? (ex. 1XDay):1xday  Is this a 30 day or 90 day RX:90  Preferred Pharmacy with phone number:"[facility]" [facility] [place] [employer] [place] - [address]  Phone: [phone]  Fax:[phone]  Local or Mail Order:local  Ordering Provider:Margarita  Would the patient rather a call back or a response via [facility]? Call back  Best Call Back Number:[phone]  Additional Information: pt needs authorization from dr to refill prescription        .Type:  RX Refill Request    Who Called: pt  Refill or New Rx:refill  RX Name and Strength:rosuvastatin (CRESTOR) 10 MG tablet  How is the patient currently taking it? (ex. 1XDay):  Is this a 30 day or 90 day RX:  Preferred Pharmacy with phone number:"[facility]" [facility] [place] [employer] [place] - [address]  Phone: [phone]  Fax:[phone]  Local or Mail Order:local  Ordering Provider:Margarita  Would the patient rather a call back or a response via [facility]? Call back  Best Call Back Number:[phone]  Additional Information: pt needs authorization from dr to refill prescription          .Type:  RX Refill Request    Who Called: pt  Refill or New Rx:refill  RX Name and Strength:levothyroxine (SYNTHROID) 75 MCG tablet  How is the patient currently taking it? (ex. 1XDay):1xday  Is this a 30 day or 90 day RX:90  Preferred Pharmacy with phone number:"[facility]" [facility] [place] - [address]  Phone: [phone]  Fax:[phone]  Local or Mail Order:local  Ordering Provider:Margarita  Would the patient rather a call back or a response via [facility]? Call back  Best Call Back Number:[phone]  Additional Information: pt needs authorization from dr to refill prescription

## 2024-01-02 ENCOUNTER — TELEPHONE (OUTPATIENT)
Dept: FAMILY MEDICINE | Facility: CLINIC | Age: 79
End: 2024-01-02
Payer: MEDICARE

## 2024-01-02 DIAGNOSIS — T75.3XXS MOTION SICKNESS, SEQUELA: Primary | ICD-10-CM

## 2024-01-02 RX ORDER — SCOLOPAMINE TRANSDERMAL SYSTEM 1 MG/1
1 PATCH, EXTENDED RELEASE TRANSDERMAL
Qty: 4 PATCH | Refills: 0 | Status: SHIPPED | OUTPATIENT
Start: 2024-01-02

## 2024-01-02 NOTE — TELEPHONE ENCOUNTER
I have signed for the following orders AND/OR meds.  Please call the patient and ask the patient to schedule the testing AND/OR inform about any medications that were sent.        Medications Ordered This Encounter   Medications    scopolamine (TRANSDERM-SCOP) 1.3-1.5 mg (1 mg over 3 days)     Sig: Place 1 patch onto the skin every 72 hours.     Dispense:  4 patch     Refill:  0

## 2024-01-02 NOTE — TELEPHONE ENCOUNTER
----- Message from Laine Pacheco sent at 1/2/2024  9:23 AM CST -----  .Type:  Needs Medical Advice    Who Called: pt  Symptoms (please be specific):    How long has patient had these symptoms:    Pharmacy name and phone #:  [id number] - [place], [place] - [address] AT [address]  Would the patient rather a call back or a response via [facility]?   Best Call Back Number: [phone]  Additional Information: pt going on cruise and would like a patch for nausea

## 2024-02-16 ENCOUNTER — LAB VISIT (OUTPATIENT)
Dept: LAB | Facility: HOSPITAL | Age: 79
End: 2024-02-16
Attending: FAMILY MEDICINE
Payer: MEDICARE

## 2024-02-16 ENCOUNTER — TELEPHONE (OUTPATIENT)
Dept: FAMILY MEDICINE | Facility: CLINIC | Age: 79
End: 2024-02-16
Payer: MEDICARE

## 2024-02-16 DIAGNOSIS — K21.9 GASTROESOPHAGEAL REFLUX DISEASE, UNSPECIFIED WHETHER ESOPHAGITIS PRESENT: ICD-10-CM

## 2024-02-16 DIAGNOSIS — E55.9 VITAMIN D DEFICIENCY: ICD-10-CM

## 2024-02-16 DIAGNOSIS — E66.01 CLASS 2 SEVERE OBESITY DUE TO EXCESS CALORIES WITH SERIOUS COMORBIDITY AND BODY MASS INDEX (BMI) OF 36.0 TO 36.9 IN ADULT: ICD-10-CM

## 2024-02-16 DIAGNOSIS — E78.2 MIXED HYPERLIPIDEMIA: ICD-10-CM

## 2024-02-16 DIAGNOSIS — Z71.89 ADVANCED CARE PLANNING/COUNSELING DISCUSSION: ICD-10-CM

## 2024-02-16 DIAGNOSIS — K59.09 CHRONIC CONSTIPATION: ICD-10-CM

## 2024-02-16 DIAGNOSIS — M85.80 OSTEOPENIA, UNSPECIFIED LOCATION: ICD-10-CM

## 2024-02-16 DIAGNOSIS — I25.10 ARTERIOSCLEROSIS OF CORONARY ARTERY: ICD-10-CM

## 2024-02-16 DIAGNOSIS — Z78.0 POSTMENOPAUSAL: ICD-10-CM

## 2024-02-16 DIAGNOSIS — E03.9 HYPOTHYROIDISM, UNSPECIFIED TYPE: ICD-10-CM

## 2024-02-16 DIAGNOSIS — Z00.00 MEDICARE ANNUAL WELLNESS VISIT, SUBSEQUENT: ICD-10-CM

## 2024-02-16 DIAGNOSIS — Z00.00 MEDICARE ANNUAL WELLNESS VISIT, SUBSEQUENT: Primary | ICD-10-CM

## 2024-02-16 DIAGNOSIS — I10 PRIMARY HYPERTENSION: ICD-10-CM

## 2024-02-16 LAB
ALBUMIN SERPL-MCNC: 3.8 G/DL (ref 3.4–4.8)
ALBUMIN/GLOB SERPL: 1.3 RATIO (ref 1.1–2)
ALP SERPL-CCNC: 52 UNIT/L (ref 40–150)
ALT SERPL-CCNC: 11 UNIT/L (ref 0–55)
APPEARANCE UR: ABNORMAL
AST SERPL-CCNC: 18 UNIT/L (ref 5–34)
BACTERIA #/AREA URNS AUTO: ABNORMAL /HPF
BASOPHILS # BLD AUTO: 0.04 X10(3)/MCL
BASOPHILS NFR BLD AUTO: 0.7 %
BILIRUB SERPL-MCNC: 0.5 MG/DL
BILIRUB UR QL STRIP.AUTO: NEGATIVE
BUN SERPL-MCNC: 10.5 MG/DL (ref 9.8–20.1)
CALCIUM SERPL-MCNC: 9.7 MG/DL (ref 8.4–10.2)
CHLORIDE SERPL-SCNC: 106 MMOL/L (ref 98–107)
CHOLEST SERPL-MCNC: 157 MG/DL
CHOLEST/HDLC SERPL: 2 {RATIO} (ref 0–5)
CO2 SERPL-SCNC: 26 MMOL/L (ref 23–31)
COLOR UR AUTO: ABNORMAL
CREAT SERPL-MCNC: 0.78 MG/DL (ref 0.55–1.02)
DEPRECATED CALCIDIOL+CALCIFEROL SERPL-MC: 57.4 NG/ML (ref 30–80)
EOSINOPHIL # BLD AUTO: 0.35 X10(3)/MCL (ref 0–0.9)
EOSINOPHIL NFR BLD AUTO: 6.5 %
ERYTHROCYTE [DISTWIDTH] IN BLOOD BY AUTOMATED COUNT: 12.4 % (ref 11.5–17)
GFR SERPLBLD CREATININE-BSD FMLA CKD-EPI: >60 MLS/MIN/1.73/M2
GLOBULIN SER-MCNC: 3 GM/DL (ref 2.4–3.5)
GLUCOSE SERPL-MCNC: 102 MG/DL (ref 82–115)
GLUCOSE UR QL STRIP.AUTO: NEGATIVE
HCT VFR BLD AUTO: 40.3 % (ref 37–47)
HDLC SERPL-MCNC: 66 MG/DL (ref 35–60)
HGB BLD-MCNC: 13.8 G/DL (ref 12–16)
IMM GRANULOCYTES # BLD AUTO: 0 X10(3)/MCL (ref 0–0.04)
IMM GRANULOCYTES NFR BLD AUTO: 0 %
KETONES UR QL STRIP.AUTO: NEGATIVE
LDLC SERPL CALC-MCNC: 79 MG/DL (ref 50–140)
LEUKOCYTE ESTERASE UR QL STRIP.AUTO: ABNORMAL
LYMPHOCYTES # BLD AUTO: 1.25 X10(3)/MCL (ref 0.6–4.6)
LYMPHOCYTES NFR BLD AUTO: 23.1 %
MCH RBC QN AUTO: 33.1 PG (ref 27–31)
MCHC RBC AUTO-ENTMCNC: 34.2 G/DL (ref 33–36)
MCV RBC AUTO: 96.6 FL (ref 80–94)
MONOCYTES # BLD AUTO: 0.33 X10(3)/MCL (ref 0.1–1.3)
MONOCYTES NFR BLD AUTO: 6.1 %
MUCOUS THREADS URNS QL MICRO: ABNORMAL /LPF
NEUTROPHILS # BLD AUTO: 3.43 X10(3)/MCL (ref 2.1–9.2)
NEUTROPHILS NFR BLD AUTO: 63.6 %
NITRITE UR QL STRIP.AUTO: NEGATIVE
NRBC BLD AUTO-RTO: 0 %
PH UR STRIP.AUTO: 7.5 [PH]
PLATELET # BLD AUTO: 297 X10(3)/MCL (ref 130–400)
PMV BLD AUTO: 9.5 FL (ref 7.4–10.4)
POTASSIUM SERPL-SCNC: 4.6 MMOL/L (ref 3.5–5.1)
PROT SERPL-MCNC: 6.8 GM/DL (ref 5.8–7.6)
PROT UR QL STRIP.AUTO: NEGATIVE
RBC # BLD AUTO: 4.17 X10(6)/MCL (ref 4.2–5.4)
RBC #/AREA URNS AUTO: ABNORMAL /HPF
RBC UR QL AUTO: NEGATIVE
SODIUM SERPL-SCNC: 138 MMOL/L (ref 136–145)
SP GR UR STRIP.AUTO: 1.01 (ref 1–1.03)
SQUAMOUS #/AREA URNS AUTO: ABNORMAL /HPF
TRIGL SERPL-MCNC: 59 MG/DL (ref 37–140)
TSH SERPL-ACNC: 0.49 UIU/ML (ref 0.35–4.94)
UROBILINOGEN UR STRIP-ACNC: 0.2
VLDLC SERPL CALC-MCNC: 12 MG/DL
WBC # SPEC AUTO: 5.4 X10(3)/MCL (ref 4.5–11.5)
WBC #/AREA URNS AUTO: ABNORMAL /HPF

## 2024-02-16 PROCEDURE — 80053 COMPREHEN METABOLIC PANEL: CPT

## 2024-02-16 PROCEDURE — 36415 COLL VENOUS BLD VENIPUNCTURE: CPT

## 2024-02-16 PROCEDURE — 84443 ASSAY THYROID STIM HORMONE: CPT

## 2024-02-16 PROCEDURE — 85025 COMPLETE CBC W/AUTO DIFF WBC: CPT

## 2024-02-16 PROCEDURE — 80061 LIPID PANEL: CPT

## 2024-02-16 PROCEDURE — 82306 VITAMIN D 25 HYDROXY: CPT

## 2024-02-16 PROCEDURE — 81003 URINALYSIS AUTO W/O SCOPE: CPT

## 2024-02-20 ENCOUNTER — OFFICE VISIT (OUTPATIENT)
Dept: FAMILY MEDICINE | Facility: CLINIC | Age: 79
End: 2024-02-20
Payer: MEDICARE

## 2024-02-20 VITALS
HEART RATE: 79 BPM | SYSTOLIC BLOOD PRESSURE: 110 MMHG | TEMPERATURE: 98 F | HEIGHT: 59 IN | WEIGHT: 182.31 LBS | DIASTOLIC BLOOD PRESSURE: 80 MMHG | BODY MASS INDEX: 36.75 KG/M2 | OXYGEN SATURATION: 96 % | RESPIRATION RATE: 20 BRPM

## 2024-02-20 DIAGNOSIS — K59.09 CHRONIC CONSTIPATION: ICD-10-CM

## 2024-02-20 DIAGNOSIS — E78.2 MIXED HYPERLIPIDEMIA: ICD-10-CM

## 2024-02-20 DIAGNOSIS — E03.9 ACQUIRED HYPOTHYROIDISM: ICD-10-CM

## 2024-02-20 DIAGNOSIS — K21.9 GASTROESOPHAGEAL REFLUX DISEASE WITHOUT ESOPHAGITIS: ICD-10-CM

## 2024-02-20 DIAGNOSIS — N32.81 OVERACTIVE BLADDER: ICD-10-CM

## 2024-02-20 DIAGNOSIS — I10 PRIMARY HYPERTENSION: Primary | ICD-10-CM

## 2024-02-20 PROCEDURE — 99214 OFFICE O/P EST MOD 30 MIN: CPT | Mod: ,,, | Performed by: NURSE PRACTITIONER

## 2024-02-20 NOTE — PROGRESS NOTES
Subjective:      Patient ID: Mary Lou Grajeda is a 78 y.o. White female      Chief Complaint: Follow-up (Chronic Ilness)       Past Medical History:   Diagnosis Date    Arteriosclerosis of coronary artery 07/25/2022    Chronic constipation 07/25/2022    Gastroesophageal reflux disease 07/25/2022    History of breast cancer 07/25/2022    Hyperlipidemia 07/25/2022    Hypertension     Osteoporosis     Overactive bladder 07/25/2022    Personal history of colonic polyps 08/24/2021    COLONOSCOPY        HPI  Presents to clinic for 6-month follow up    Memory loss:  Is on prevagen or neuriva.  Not interested in seeing neuro.       HTN:  Currently taking Norvasc and Benazepril. She denies any chest pain shortness of breath or weakness. Dr. Younger is her cardiologist.     HLD:  Tolerating Statin well.       Hypothyroidism:  TSH  is within normal range.  She is compliant with her Synthroid.      Chronic constipation:  On linzess 3x/week.     Has GERD and is on nexium. GI is dr. ramon.      OAB and on vesicare.  Following UroGYN (MD- dr. Salas) uterovaginal prolapse cystocele rectocele and pessary care.          Patient Care Team:  Anitha Avila MD as PCP - General (Family Medicine)  Anitha Avila MD Noel, Jacque F III, MD as Consulting Physician (Gastroenterology)  Franc Bal MD as Consulting Physician (Orthopedic Surgery)  Jan Younger MD as Consulting Physician (Cardiology)  Anitha Manjarrez MD as Obstetrician (Obstetrics)      Review of Systems   Constitutional: Negative.  Negative for appetite change, chills and fever.   HENT: Negative.     Eyes: Negative.  Negative for discharge and redness.   Respiratory: Negative.  Negative for shortness of breath.    Cardiovascular: Negative.  Negative for chest pain.   Gastrointestinal: Negative.    Endocrine: Negative.    Genitourinary: Negative.    Integumentary:  Negative.   Allergic/Immunologic: Negative.    Neurological: Negative.     Psychiatric/Behavioral: Negative.     All other systems reviewed and are negative.          Objective:      Vitals:    02/20/24 1427   BP: 110/80   Pulse: 79   Resp: 20   Temp: 97.7 °F (36.5 °C)      Body mass index is 36.82 kg/m².     Physical Exam  Vitals reviewed.   Constitutional:       Appearance: Normal appearance.   HENT:      Head: Normocephalic.      Mouth/Throat:      Mouth: Mucous membranes are moist.   Eyes:      Conjunctiva/sclera: Conjunctivae normal.      Pupils: Pupils are equal, round, and reactive to light.   Cardiovascular:      Rate and Rhythm: Normal rate and regular rhythm.   Pulmonary:      Effort: Pulmonary effort is normal. No respiratory distress.      Breath sounds: Normal breath sounds.   Musculoskeletal:         General: Normal range of motion.      Cervical back: Normal range of motion and neck supple.   Skin:     General: Skin is warm and dry.   Neurological:      Mental Status: She is alert and oriented to person, place, and time.   Psychiatric:         Mood and Affect: Mood normal.            Current Outpatient Medications:     alendronate (FOSAMAX) 70 MG tablet, Take 1 tablet (70 mg total) by mouth every 7 days., Disp: 12 tablet, Rfl: 3    amLODIPine (NORVASC) 10 MG tablet, Take 1 tablet (10 mg total) by mouth once daily., Disp: 90 tablet, Rfl: 3    aspirin (ECOTRIN) 81 MG EC tablet, Take 81 mg by mouth once daily., Disp: , Rfl:     B6/folic/B12/coffee/phosphatid (NEURIVA PLUS BRAIN PERFORMANCE ORAL), Take by mouth., Disp: , Rfl:     benazepriL (LOTENSIN) 20 MG tablet, Take 1 tablet (20 mg total) by mouth once daily., Disp: 90 tablet, Rfl: 3    calcium carbonate-vitamin D3 (CALCIUM 600 WITH VITAMIN D3) 600 mg-10 mcg (400 unit) Chew, Take 1 tablet by mouth once daily., Disp: , Rfl:     coenzyme Q10 200 mg capsule, Take 200 mg by mouth., Disp: , Rfl:     esomeprazole (NEXIUM) 40 MG capsule, Take 1 capsule (40 mg total) by mouth before breakfast., Disp: 90 capsule, Rfl: 3     levothyroxine (SYNTHROID) 75 MCG tablet, Take 1 tablet (75 mcg total) by mouth before breakfast., Disp: 90 tablet, Rfl: 3    LINZESS 145 mcg Cap capsule, Take 145 mcg by mouth., Disp: , Rfl:     polyethylene glycol (GLYCOLAX) 17 gram/dose powder, Take 17 g by mouth., Disp: , Rfl:     rosuvastatin (CRESTOR) 10 MG tablet, Take 1 tablet (10 mg total) by mouth once daily., Disp: 90 tablet, Rfl: 3    solifenacin (VESICARE) 10 MG tablet, Take 1 tablet (10 mg total) by mouth once daily., Disp: 90 tablet, Rfl: 3    albuterol (PROVENTIL/VENTOLIN HFA) 90 mcg/actuation inhaler, Inhale 2 puffs into the lungs every 4 (four) hours as needed for Wheezing or Shortness of Breath. (Patient not taking: Reported on 2/20/2024), Disp: 18 g, Rfl: 11    estradioL (ESTRACE) 0.01 % (0.1 mg/gram) vaginal cream, Place 0.5 g vaginally., Disp: , Rfl:     red yeast rice 600 mg Cap, Take 1,200 mg by mouth once daily., Disp: , Rfl:     scopolamine (TRANSDERM-SCOP) 1.3-1.5 mg (1 mg over 3 days), Place 1 patch onto the skin every 72 hours. (Patient not taking: Reported on 2/20/2024), Disp: 4 patch, Rfl: 0    Assessment & Plan:     Problem List Items Addressed This Visit          Cardiac/Vascular    Hyperlipidemia     Stable  Continue Statin as prescribed  Keep appt with PCP for follow up           Hypertension - Primary     BP at goal  Continue Norvasc and Benazepril as prescribed  Daily BP check; bring log all clinic visits  Instructed to report to ED for any BP greater than 200/100, dizziness, syncope, CP, or SOB  Keep appt. With PCP for follow up  Patient is agreeable to plan and verbalizes understanding              Renal/    Overactive bladder     Stable  Continue current meds  Keep appt with Urology for follow up              Endocrine    Hypothyroidism     Euthyroid  Continue Levothyroxine as prescribed  Keep appt with PCP for follow up            GI    Chronic constipation     Stable  Continue Linzess as prescribed  Keep appt with PCP for  follow up           Gastroesophageal reflux disease     Stable  Continue PPI as prescribed  Keep appt with PCP for follow up

## 2024-02-20 NOTE — ASSESSMENT & PLAN NOTE
BP at goal  Continue Norvasc and Benazepril as prescribed  Daily BP check; bring log all clinic visits  Instructed to report to ED for any BP greater than 200/100, dizziness, syncope, CP, or SOB  Keep appt. With PCP for follow up  Patient is agreeable to plan and verbalizes understanding

## 2024-07-29 ENCOUNTER — TELEPHONE (OUTPATIENT)
Dept: FAMILY MEDICINE | Facility: CLINIC | Age: 79
End: 2024-07-29
Payer: MEDICARE

## 2024-07-29 DIAGNOSIS — K21.9 GASTROESOPHAGEAL REFLUX DISEASE, UNSPECIFIED WHETHER ESOPHAGITIS PRESENT: ICD-10-CM

## 2024-07-29 DIAGNOSIS — E78.2 MIXED HYPERLIPIDEMIA: ICD-10-CM

## 2024-07-29 DIAGNOSIS — E03.9 HYPOTHYROIDISM, UNSPECIFIED TYPE: ICD-10-CM

## 2024-07-29 DIAGNOSIS — E03.9 ACQUIRED HYPOTHYROIDISM: ICD-10-CM

## 2024-07-29 DIAGNOSIS — M85.80 OSTEOPENIA, UNSPECIFIED LOCATION: ICD-10-CM

## 2024-07-29 DIAGNOSIS — I10 PRIMARY HYPERTENSION: Primary | ICD-10-CM

## 2024-07-29 DIAGNOSIS — K21.9 GASTROESOPHAGEAL REFLUX DISEASE WITHOUT ESOPHAGITIS: ICD-10-CM

## 2024-07-29 DIAGNOSIS — Z78.0 POSTMENOPAUSAL: ICD-10-CM

## 2024-07-29 DIAGNOSIS — Z00.00 MEDICARE ANNUAL WELLNESS VISIT, SUBSEQUENT: ICD-10-CM

## 2024-07-29 DIAGNOSIS — E55.9 VITAMIN D DEFICIENCY: ICD-10-CM

## 2024-07-29 NOTE — PROGRESS NOTES
Patient ID: [id number]     Chief Complaint: Medicare Wellness and Lab Results      HPI:     Mary Lou Grajeda is a 79 y.o. female here today for a Medicare Wellness.  Denies any acute problems.     Labs previously done and reviewed with patient  MMG and DEXA up to date  Tdap, RSV, Pneumovax, and Influenza up to date  Shingles vaccine up to date  Covid vaccine due    Opioid Screening: Patient medication list reviewed, patient is not taking prescription opioids. Patient is not using additional opioids than prescribed. Patient is at low risk of substance abuse based on this opioid use history.       -------------------------------------    Arteriosclerosis of coronary artery    Chronic constipation    Gastroesophageal reflux disease    History of breast cancer    Hyperlipidemia    Hypertension    Osteoporosis    Overactive bladder    Personal history of colonic polyps    COLONOSCOPY        Past Surgical History:   Procedure Laterality Date    COLONOSCOPY  08/24/2021    Andra Fine III, MD       Review of patient's allergies indicates:   Allergen Reactions    Prochlorperazine     Propoxyphene-asa-caffeine     Propoxyphene Nausea Only       Outpatient Medications Marked as Taking for the 7/30/24 encounter (Office Visit) with Taryn Connell NP   Medication Sig Dispense Refill    alendronate (FOSAMAX) 70 MG tablet Take 1 tablet (70 mg total) by mouth every 7 days. 12 tablet 3    amLODIPine (NORVASC) 10 MG tablet Take 1 tablet (10 mg total) by mouth once daily. 90 tablet 3    aspirin (ECOTRIN) 81 MG EC tablet Take 81 mg by mouth once daily.      B6/folic/B12/coffee/phosphatid (NEURIVA PLUS BRAIN PERFORMANCE ORAL) Take by mouth.      benazepriL (LOTENSIN) 20 MG tablet Take 1 tablet (20 mg total) by mouth once daily. 90 tablet 3    calcium carbonate-vitamin D3 (CALCIUM 600 WITH VITAMIN D3) 600 mg-10 mcg (400 unit) Chew Take 1 tablet by mouth once daily.      coenzyme Q10 200 mg capsule Take 200 mg by mouth.       esomeprazole (NEXIUM) 40 MG capsule Take 1 capsule (40 mg total) by mouth before breakfast. 90 capsule 3    estradioL (ESTRACE) 0.01 % (0.1 mg/gram) vaginal cream Place 0.5 g vaginally.      levothyroxine (SYNTHROID) 75 MCG tablet Take 1 tablet (75 mcg total) by mouth before breakfast. 90 tablet 3    LINZESS 145 mcg Cap capsule Take 145 mcg by mouth.      polyethylene glycol (GLYCOLAX) 17 gram/dose powder Take 17 g by mouth.      rosuvastatin (CRESTOR) 10 MG tablet Take 1 tablet (10 mg total) by mouth once daily. 90 tablet 3    solifenacin (VESICARE) 10 MG tablet Take 1 tablet (10 mg total) by mouth once daily. 90 tablet 3    [DISCONTINUED] red yeast rice 600 mg Cap Take 1,200 mg by mouth once daily.         Social History     Socioeconomic History    Marital status:    Tobacco Use    Smoking status: Never    Smokeless tobacco: Never   Substance and Sexual Activity    Alcohol use: Never    Drug use: Never   Social History Narrative    ** Merged History Encounter **             No family history on file.     Patient Care Team:  Anitha Avila MD as PCP - General (Family Medicine)  Anitha Avila MD Noel, Jacque F III, MD as Consulting Physician (Gastroenterology)  Franc Bal MD as Consulting Physician (Orthopedic Surgery)  Jan Younger MD as Consulting Physician (Cardiology)  Anitha Manjarrez MD as Obstetrician (Obstetrics)       Subjective:     Review of Systems   All other systems reviewed and are negative.        Patient Reported Health Risk Assessment  What [age] 70-79  Are you male or female?: Female  During the past four weeks, how much have you been bothered by emotional problems such as feeling anxious, depressed, irritable, sad, or downhearted and blue?: Not at all  During the past five weeks, has your physical and/or emotional health limited your social activities with family, friends, neighbors, or groups?: Not at all  During the past four weeks, how much bodily  pain have you generally had?: Mild pain  During the past four weeks, was someone available to help if you needed and wanted help?: Yes, as much as I wanted  During the past four weeks, what was the hardest physical activity you could do for at least two minutes?: Very light  Can you get to places out of walking distance without help?  (For example, can you travel alone on buses or taxis, or drive your own car?): Yes  Can you go shopping for groceries or clothes without someone's help?: Yes  Can you prepare your own meals?: Yes  Can you do your own housework without help?: Yes  Because of any health problems, do you need the help of another person with your personal care needs such as eating, bathing, dressing, or getting around the house?: No  Can you handle your own money without help?: Yes  During the past four weeks, how would you rate your health in general?: Good  How have things been going for you during the past four weeks?: Pretty well  Are you having difficulties driving your car?: No  Do you always fasten your seat belt when you are in a car?: Yes, usually  How often in the past four weeks have you been bothered by falling or dizzy when standing up?: Never  How often in the past four weeks have you been bothered by trouble eating well?: Never  How often in the past four weeks have you been bothered by teeth or denture problems?: Never  How often in the past four weeks have you been bothered with problems using the telephone?: Never  How often in the past four weeks have you been bothered by tiredness or fatigue?: Sometimes  Have you fallen two or more times in the past year?: No  Are you afraid of falling?: No  Are you a smoker?: No  During the past four weeks, how many drinks of wine, beer, or other alcoholic beverages did you have?: No alcohol at all  Do you exercise for about 20 minutes three or more days a week?: No, I usually do not exercise this much  Have you been given any information to help you  "with hazards in your house that might hurt you?: No  Have you been given any information to help you with keeping track of your medications?: No  How often do you have trouble taking medicines the way you've been told to take them?: I always take them as prescribed  How confident are you that you can control and manage most of your health problems?: Very confident  What is your race? (Check all that apply.):     Objective:     /80 (BP Location: Left arm, Patient Position: Sitting, BP Method: Large (Automatic))   Pulse 83   Temp 97.9 °F (36.6 °C) (Oral)   Resp 20   Ht 4' 11" (1.499 m)   Wt 79.5 kg (175 lb 4.8 oz)   LMP  (LMP Unknown)   SpO2 97%   BMI 35.41 kg/m²     Physical Exam  Vitals reviewed.   Constitutional:       Appearance: Normal appearance.   HENT:      Head: Normocephalic.      Mouth/Throat:      Mouth: Mucous membranes are moist.   Eyes:      Conjunctiva/sclera: Conjunctivae normal.      Pupils: Pupils are equal, round, and reactive to light.   Cardiovascular:      Rate and Rhythm: Normal rate and regular rhythm.   Pulmonary:      Effort: Pulmonary effort is normal. No respiratory distress.      Breath sounds: Normal breath sounds.   Abdominal:      General: Bowel sounds are normal. There is no distension.      Palpations: Abdomen is soft.      Tenderness: There is no abdominal tenderness.   Musculoskeletal:         General: Normal range of motion.      Cervical back: Normal range of motion and neck supple.   Skin:     General: Skin is warm and dry.   Neurological:      Mental Status: She is alert and oriented to person, place, and time.   Psychiatric:         Mood and Affect: Mood normal.                No data to display                  7/30/2024     2:00 PM 2/20/2024     2:20 PM 7/26/2023     1:30 PM 2/28/2023     2:30 PM 1/18/2023    11:15 AM 7/25/2022     1:30 PM   Fall Risk Assessment - Outpatient   Mobility Status Ambulatory Ambulatory Ambulatory Ambulatory Ambulatory " Ambulatory   Number of falls 0 0 0 0 0 0   Identified as fall risk False False False False False False           Depression Screening  Over the past two weeks, has the patient felt down, depressed, or hopeless?: No  Over the past two weeks, has the patient felt little interest or pleasure in doing things?: No  Functional Ability/Safety Screening  Was the patient's timed Up & Go test unsteady or longer than 30 seconds?: No  Does the patient need help with phone, transportation, shopping, preparing meals, housework, laundry, meds, or managing money?: No  Does the patient's home have rugs in the hallway, lack grab bars in the bathroom, lack handrails on the stairs or have poor lighting?: No  Have you noticed any hearing difficulties?: No  Cognitive Function (Assessed through direct observation with due consideration of information obtained by way of patient reports and/or concerns raised by family, friends, caretakers, or others)    Does the patient repeat questions/statements in the same day?: No  Does the patient have trouble remembering the date, year, and time?: No  Does the patient have difficulty managing finances?: No  Does the patient have a decreased sense of direction?: No  Assessment/Plan:     1. Wellness examination  Assessment & Plan:  Labs previously done and reviewed with patient  MMG and DEXA up to date  Tdap, RSV, Pneumovax, and Influenza up to date  Shingles vaccine up to date  Covid vaccine due      2. Primary hypertension  Assessment & Plan:  BP at goal  Continue current medication (Benazepril and Norvasc)   Daily BP check; bring log all clinic visits  Instructed to report to ED for any BP greater than 200/100, dizziness, syncope, CP, or SOB  Keep appt. With PCP for follow up  Patient is agreeable to plan and verbalizes understanding        3. Mixed hyperlipidemia  Assessment & Plan:  Stable  Continue Statin as prescribed  Keep appt with PCP for follow up            4. Overactive bladder  Assessment  & Plan:  Stable  Continue Vesicare  Keep appt with Urology for follow up         5. Arteriosclerosis of coronary artery  Assessment & Plan:  Stable  Keep appt with Cardiology for follow up            6. Acquired hypothyroidism  Assessment & Plan:  Stable  Continue Levothyroxine as prescribed  Keep appt with PCP for follow up        7. Vitamin D deficiency  Assessment & Plan:  Stable  Continue Vitamin D supplements  Keep appt with PCP for follow up        8. Chronic constipation  Assessment & Plan:  Stable  Continue current meds (Linzess)  Keep appt with PCP for follow up        9. Gastroesophageal reflux disease without esophagitis  Assessment & Plan:  Stable  Continue PPI  Keep appt with PCP for follow up        10. Osteopenia, unspecified location  Assessment & Plan:  On ca and vit d.   DEXA up to date  Encouraged weight bearing exercises       11. History of breast cancer  Assessment & Plan:  MMG up to date      12. Advanced care planning/counseling discussion  Assessment & Plan:  Advanced Care Planning:  I attest that I have had a face-to-face discussion with patient       13. Aortic atherosclerosis  Assessment & Plan:  Stable  Keep appt with Cardiology for follow up        14. Class 2 severe obesity due to excess calories with serious comorbidity and body mass index (BMI) of 35.0 to 35.9 in adult  Assessment & Plan:  Exercise as tolerated             Medicare Annual Wellness and Personalized Prevention Plan:   Fall Risk + Home Safety + Hearing Impairment + Depression Screen + Opioid and Substance Abuse Screening + Cognitive Impairment Screen + Health Risk Assessment all reviewed.     Health Maintenance Topics with due status: Not Due       Topic Last Completion Date    TETANUS VACCINE 08/18/2021    DEXA Scan 03/25/2022    Influenza Vaccine 09/19/2023    Aspirin/Antiplatelet Therapy 02/20/2024    Lipid Panel 07/30/2024      The patient's Health Maintenance was reviewed and the following appears to be due at this  time:   Health Maintenance Due   Topic Date Due    COVID-19 Vaccine (6 - 2023-24 season) 05/11/2024       Advance Care Planning   I attest to discussing Advance Care Planning with patient and/or family member.  Education was provided including the importance of the Health Care Power of , Advance Directives, and/or [place] documentation.  The patient expressed understanding to the importance of this information and discussion.         Follow up in about 6 months (around 1/30/2025) for F/U with PCP; chronic illnesses. In addition to their scheduled follow up, the patient has also been instructed to follow up on as needed basis.

## 2024-07-30 ENCOUNTER — LAB VISIT (OUTPATIENT)
Dept: LAB | Facility: HOSPITAL | Age: 79
End: 2024-07-30
Attending: FAMILY MEDICINE
Payer: COMMERCIAL

## 2024-07-30 ENCOUNTER — OFFICE VISIT (OUTPATIENT)
Dept: FAMILY MEDICINE | Facility: CLINIC | Age: 79
End: 2024-07-30
Payer: COMMERCIAL

## 2024-07-30 VITALS
TEMPERATURE: 98 F | DIASTOLIC BLOOD PRESSURE: 80 MMHG | OXYGEN SATURATION: 97 % | BODY MASS INDEX: 35.34 KG/M2 | RESPIRATION RATE: 20 BRPM | HEIGHT: 59 IN | WEIGHT: 175.31 LBS | SYSTOLIC BLOOD PRESSURE: 124 MMHG | HEART RATE: 83 BPM

## 2024-07-30 DIAGNOSIS — E03.9 HYPOTHYROIDISM, UNSPECIFIED TYPE: ICD-10-CM

## 2024-07-30 DIAGNOSIS — M85.80 OSTEOPENIA, UNSPECIFIED LOCATION: ICD-10-CM

## 2024-07-30 DIAGNOSIS — E55.9 VITAMIN D DEFICIENCY: ICD-10-CM

## 2024-07-30 DIAGNOSIS — K59.09 CHRONIC CONSTIPATION: ICD-10-CM

## 2024-07-30 DIAGNOSIS — Z85.3 HISTORY OF BREAST CANCER: ICD-10-CM

## 2024-07-30 DIAGNOSIS — Z71.89 ADVANCED CARE PLANNING/COUNSELING DISCUSSION: ICD-10-CM

## 2024-07-30 DIAGNOSIS — K21.9 GASTROESOPHAGEAL REFLUX DISEASE, UNSPECIFIED WHETHER ESOPHAGITIS PRESENT: ICD-10-CM

## 2024-07-30 DIAGNOSIS — E78.2 MIXED HYPERLIPIDEMIA: ICD-10-CM

## 2024-07-30 DIAGNOSIS — I10 PRIMARY HYPERTENSION: ICD-10-CM

## 2024-07-30 DIAGNOSIS — I70.0 AORTIC ATHEROSCLEROSIS: ICD-10-CM

## 2024-07-30 DIAGNOSIS — Z00.00 WELLNESS EXAMINATION: Primary | ICD-10-CM

## 2024-07-30 DIAGNOSIS — E66.01 CLASS 2 SEVERE OBESITY DUE TO EXCESS CALORIES WITH SERIOUS COMORBIDITY AND BODY MASS INDEX (BMI) OF 35.0 TO 35.9 IN ADULT: ICD-10-CM

## 2024-07-30 DIAGNOSIS — K21.9 GASTROESOPHAGEAL REFLUX DISEASE WITHOUT ESOPHAGITIS: ICD-10-CM

## 2024-07-30 DIAGNOSIS — E03.9 ACQUIRED HYPOTHYROIDISM: ICD-10-CM

## 2024-07-30 DIAGNOSIS — Z78.0 POSTMENOPAUSAL: ICD-10-CM

## 2024-07-30 DIAGNOSIS — N32.81 OVERACTIVE BLADDER: ICD-10-CM

## 2024-07-30 DIAGNOSIS — I25.10 ARTERIOSCLEROSIS OF CORONARY ARTERY: ICD-10-CM

## 2024-07-30 DIAGNOSIS — Z00.00 MEDICARE ANNUAL WELLNESS VISIT, SUBSEQUENT: ICD-10-CM

## 2024-07-30 PROBLEM — E66.812 CLASS 2 SEVERE OBESITY DUE TO EXCESS CALORIES WITH SERIOUS COMORBIDITY AND BODY MASS INDEX (BMI) OF 35.0 TO 35.9 IN ADULT: Status: ACTIVE | Noted: 2024-07-30

## 2024-07-30 PROBLEM — E66.812 CLASS 2 SEVERE OBESITY WITH SERIOUS COMORBIDITY AND BODY MASS INDEX (BMI) OF 36.0 TO 36.9 IN ADULT: Status: RESOLVED | Noted: 2023-01-18 | Resolved: 2024-07-30

## 2024-07-30 LAB
25(OH)D3+25(OH)D2 SERPL-MCNC: 46 NG/ML (ref 30–80)
ALBUMIN SERPL-MCNC: 3.8 G/DL (ref 3.4–4.8)
ALBUMIN/GLOB SERPL: 1.4 RATIO (ref 1.1–2)
ALP SERPL-CCNC: 44 UNIT/L (ref 40–150)
ALT SERPL-CCNC: 13 UNIT/L (ref 0–55)
ANION GAP SERPL CALC-SCNC: 9 MEQ/L
AST SERPL-CCNC: 19 UNIT/L (ref 5–34)
BACTERIA #/AREA URNS AUTO: ABNORMAL /HPF
BASOPHILS # BLD AUTO: 0.05 X10(3)/MCL
BASOPHILS NFR BLD AUTO: 1 %
BILIRUB SERPL-MCNC: 0.5 MG/DL
BILIRUB UR QL STRIP.AUTO: NEGATIVE
BUN SERPL-MCNC: 7.4 MG/DL (ref 9.8–20.1)
CALCIUM SERPL-MCNC: 9.5 MG/DL (ref 8.4–10.2)
CHLORIDE SERPL-SCNC: 106 MMOL/L (ref 98–107)
CHOLEST SERPL-MCNC: 176 MG/DL
CHOLEST/HDLC SERPL: 3 {RATIO} (ref 0–5)
CLARITY UR: ABNORMAL
CO2 SERPL-SCNC: 24 MMOL/L (ref 23–31)
COLOR UR AUTO: YELLOW
CREAT SERPL-MCNC: 0.73 MG/DL (ref 0.55–1.02)
CREAT/UREA NIT SERPL: 10
EOSINOPHIL # BLD AUTO: 0.34 X10(3)/MCL (ref 0–0.9)
EOSINOPHIL NFR BLD AUTO: 7.1 %
ERYTHROCYTE [DISTWIDTH] IN BLOOD BY AUTOMATED COUNT: 13 % (ref 11.5–17)
GFR SERPLBLD CREATININE-BSD FMLA CKD-EPI: >60 ML/MIN/1.73/M2
GLOBULIN SER-MCNC: 2.7 GM/DL (ref 2.4–3.5)
GLUCOSE SERPL-MCNC: 97 MG/DL (ref 82–115)
GLUCOSE UR QL STRIP: NEGATIVE
HCT VFR BLD AUTO: 39.1 % (ref 37–47)
HDLC SERPL-MCNC: 67 MG/DL (ref 35–60)
HGB BLD-MCNC: 13.3 G/DL (ref 12–16)
HGB UR QL STRIP: NEGATIVE
IMM GRANULOCYTES # BLD AUTO: 0.01 X10(3)/MCL (ref 0–0.04)
IMM GRANULOCYTES NFR BLD AUTO: 0.2 %
KETONES UR QL STRIP: NEGATIVE
LDLC SERPL CALC-MCNC: 94 MG/DL (ref 50–140)
LEUKOCYTE ESTERASE UR QL STRIP: ABNORMAL
LYMPHOCYTES # BLD AUTO: 1.06 X10(3)/MCL (ref 0.6–4.6)
LYMPHOCYTES NFR BLD AUTO: 22.1 %
MCH RBC QN AUTO: 32.1 PG (ref 27–31)
MCHC RBC AUTO-ENTMCNC: 34 G/DL (ref 33–36)
MCV RBC AUTO: 94.4 FL (ref 80–94)
MONOCYTES # BLD AUTO: 0.31 X10(3)/MCL (ref 0.1–1.3)
MONOCYTES NFR BLD AUTO: 6.5 %
NEUTROPHILS # BLD AUTO: 3.02 X10(3)/MCL (ref 2.1–9.2)
NEUTROPHILS NFR BLD AUTO: 63.1 %
NITRITE UR QL STRIP: NEGATIVE
NRBC BLD AUTO-RTO: 0 %
PH UR STRIP: 7.5 [PH]
PLATELET # BLD AUTO: 301 X10(3)/MCL (ref 130–400)
PMV BLD AUTO: 9.4 FL (ref 7.4–10.4)
POTASSIUM SERPL-SCNC: 3.9 MMOL/L (ref 3.5–5.1)
PROT SERPL-MCNC: 6.5 GM/DL (ref 5.8–7.6)
PROT UR QL STRIP: NEGATIVE
RBC # BLD AUTO: 4.14 X10(6)/MCL (ref 4.2–5.4)
RBC #/AREA URNS AUTO: ABNORMAL /HPF
SODIUM SERPL-SCNC: 139 MMOL/L (ref 136–145)
SP GR UR STRIP.AUTO: 1.01 (ref 1–1.03)
SQUAMOUS #/AREA URNS AUTO: ABNORMAL /HPF
TRIGL SERPL-MCNC: 76 MG/DL (ref 37–140)
TSH SERPL-ACNC: 0.97 UIU/ML (ref 0.35–4.94)
UROBILINOGEN UR STRIP-ACNC: 0.2
VLDLC SERPL CALC-MCNC: 15 MG/DL
WBC # BLD AUTO: 4.79 X10(3)/MCL (ref 4.5–11.5)
WBC #/AREA URNS AUTO: ABNORMAL /HPF

## 2024-07-30 PROCEDURE — 1160F RVW MEDS BY RX/DR IN RCRD: CPT | Mod: CPTII,,, | Performed by: NURSE PRACTITIONER

## 2024-07-30 PROCEDURE — 81001 URINALYSIS AUTO W/SCOPE: CPT

## 2024-07-30 PROCEDURE — 3079F DIAST BP 80-89 MM HG: CPT | Mod: CPTII,,, | Performed by: NURSE PRACTITIONER

## 2024-07-30 PROCEDURE — 81003 URINALYSIS AUTO W/O SCOPE: CPT

## 2024-07-30 PROCEDURE — 84443 ASSAY THYROID STIM HORMONE: CPT

## 2024-07-30 PROCEDURE — 1101F PT FALLS ASSESS-DOCD LE1/YR: CPT | Mod: CPTII,,, | Performed by: NURSE PRACTITIONER

## 2024-07-30 PROCEDURE — 3288F FALL RISK ASSESSMENT DOCD: CPT | Mod: CPTII,,, | Performed by: NURSE PRACTITIONER

## 2024-07-30 PROCEDURE — 1159F MED LIST DOCD IN RCRD: CPT | Mod: CPTII,,, | Performed by: NURSE PRACTITIONER

## 2024-07-30 PROCEDURE — 80053 COMPREHEN METABOLIC PANEL: CPT

## 2024-07-30 PROCEDURE — 80061 LIPID PANEL: CPT

## 2024-07-30 PROCEDURE — 3074F SYST BP LT 130 MM HG: CPT | Mod: CPTII,,, | Performed by: NURSE PRACTITIONER

## 2024-07-30 PROCEDURE — G0439 PPPS, SUBSEQ VISIT: HCPCS | Mod: ,,, | Performed by: NURSE PRACTITIONER

## 2024-07-30 PROCEDURE — 85025 COMPLETE CBC W/AUTO DIFF WBC: CPT

## 2024-07-30 PROCEDURE — 82306 VITAMIN D 25 HYDROXY: CPT

## 2024-07-30 PROCEDURE — 1126F AMNT PAIN NOTED NONE PRSNT: CPT | Mod: CPTII,,, | Performed by: NURSE PRACTITIONER

## 2024-07-30 PROCEDURE — 87086 URINE CULTURE/COLONY COUNT: CPT

## 2024-07-30 PROCEDURE — 36415 COLL VENOUS BLD VENIPUNCTURE: CPT

## 2024-07-30 NOTE — ASSESSMENT & PLAN NOTE
Labs previously done and reviewed with patient  MMG and DEXA up to date  Tdap, RSV, Pneumovax, and Influenza up to date  Shingles vaccine up to date  Covid vaccine due

## 2024-07-30 NOTE — ASSESSMENT & PLAN NOTE
BP at goal  Continue current medication (Benazepril and Norvasc)   Daily BP check; bring log all clinic visits  Instructed to report to ED for any BP greater than 200/100, dizziness, syncope, CP, or SOB  Keep appt. With PCP for follow up  Patient is agreeable to plan and verbalizes understanding

## 2024-07-31 NOTE — PROGRESS NOTES
Please inform patient of results.    1. Ua shows possible infection. Is she having any uti symptoms? Urine culture is pending. Will call with results when available    Other labwork reviewed with patient by trenton at time of visit

## 2024-08-01 LAB — BACTERIA UR CULT: NORMAL

## 2024-08-01 NOTE — PROGRESS NOTES
Please inform patient of results.    1. Final urine culture shows no growth. Encourage fluids. Monitor symptoms. Contact clinic for concerns

## 2024-08-19 ENCOUNTER — TELEPHONE (OUTPATIENT)
Dept: FAMILY MEDICINE | Facility: CLINIC | Age: 79
End: 2024-08-19
Payer: COMMERCIAL

## 2024-08-19 DIAGNOSIS — R06.02 SHORTNESS OF BREATH: Primary | ICD-10-CM

## 2024-08-19 RX ORDER — ALBUTEROL SULFATE 90 UG/1
2 INHALANT RESPIRATORY (INHALATION) EVERY 6 HOURS PRN
Qty: 18 G | Refills: 3 | Status: SHIPPED | OUTPATIENT
Start: 2024-08-19

## 2024-08-19 NOTE — TELEPHONE ENCOUNTER
I didn't see inhaler on her med list but I looked back and albuterol was listed on note from dr. Salas so I sent rx in.     I have signed for the following orders AND/OR meds.  Please call the patient and ask the patient to schedule the testing AND/OR inform about any medications that were sent.        Medications Ordered This Encounter   Medications    albuterol (VENTOLIN HFA) 90 mcg/actuation inhaler     Sig: Inhale 2 puffs into the lungs every 6 (six) hours as needed for Wheezing. Rescue     Dispense:  18 g     Refill:  3

## 2024-08-19 NOTE — TELEPHONE ENCOUNTER
Mrs arias said in July she was SOB and [place] [facility] for everything. Dx with upper respiratory infection. Was given abx and an albuterol sulfate 90 mcg inhaler. Symptoms had resolved sine completing abx and using inhaler    She said last night she woke up in the middle of the night and felt SOB again. She used the inhaler, and sat up for a little while until she felt better. Said this morning she still felt ok, but used the last pump of inhaler. She asked if you could send in new rx for another inhaler for her to keep on hand? Meryl at [facility] and fatou

## 2024-08-19 NOTE — TELEPHONE ENCOUNTER
----- Message from Michelle Lainez sent at 8/19/2024  1:36 PM CDT -----  Who Called: Mary Lou Grajeda    Caller is requesting assistance/information from provider's office.    Symptoms (please be specific):    How long has patient had these symptoms:    List of preferred pharmacies on file (remove unneeded): [unfilled]  If different, enter pharmacy into here including location and phone number: [facility] - [place] [address]    Patient's Preferred Phone Number on File: [phone]   Best Call Back Number, if different:  Additional Information: pt would like to speak to nurse about prescribing inhaler , pleas e follow up

## 2024-08-23 ENCOUNTER — HOSPITAL ENCOUNTER (OUTPATIENT)
Dept: RADIOLOGY | Facility: CLINIC | Age: 79
Discharge: HOME | End: 2024-08-23
Attending: PHYSICIAN ASSISTANT
Payer: COMMERCIAL

## 2024-08-23 ENCOUNTER — OFFICE VISIT (OUTPATIENT)
Dept: ORTHOPEDICS | Facility: CLINIC | Age: 79
End: 2024-08-23
Payer: COMMERCIAL

## 2024-08-23 VITALS
BODY MASS INDEX: 35.33 KG/M2 | WEIGHT: 175.25 LBS | SYSTOLIC BLOOD PRESSURE: 110 MMHG | HEIGHT: 59 IN | HEART RATE: 91 BPM | DIASTOLIC BLOOD PRESSURE: 77 MMHG

## 2024-08-23 DIAGNOSIS — M25.561 RIGHT KNEE PAIN, UNSPECIFIED CHRONICITY: Primary | ICD-10-CM

## 2024-08-23 DIAGNOSIS — M25.561 RIGHT KNEE PAIN, UNSPECIFIED CHRONICITY: ICD-10-CM

## 2024-08-23 DIAGNOSIS — M17.11 PRIMARY OSTEOARTHRITIS OF RIGHT KNEE: ICD-10-CM

## 2024-08-23 PROCEDURE — 1101F PT FALLS ASSESS-DOCD LE1/YR: CPT | Mod: CPTII,,, | Performed by: PHYSICIAN ASSISTANT

## 2024-08-23 PROCEDURE — 1160F RVW MEDS BY RX/DR IN RCRD: CPT | Mod: CPTII,,, | Performed by: PHYSICIAN ASSISTANT

## 2024-08-23 PROCEDURE — 3078F DIAST BP <80 MM HG: CPT | Mod: CPTII,,, | Performed by: PHYSICIAN ASSISTANT

## 2024-08-23 PROCEDURE — 3074F SYST BP LT 130 MM HG: CPT | Mod: CPTII,,, | Performed by: PHYSICIAN ASSISTANT

## 2024-08-23 PROCEDURE — 73564 X-RAY EXAM KNEE 4 OR MORE: CPT | Mod: RT,,, | Performed by: PHYSICIAN ASSISTANT

## 2024-08-23 PROCEDURE — 99213 OFFICE O/P EST LOW 20 MIN: CPT | Mod: 25,,, | Performed by: PHYSICIAN ASSISTANT

## 2024-08-23 PROCEDURE — 3288F FALL RISK ASSESSMENT DOCD: CPT | Mod: CPTII,,, | Performed by: PHYSICIAN ASSISTANT

## 2024-08-23 PROCEDURE — 1159F MED LIST DOCD IN RCRD: CPT | Mod: CPTII,,, | Performed by: PHYSICIAN ASSISTANT

## 2024-08-23 PROCEDURE — 20610 DRAIN/INJ JOINT/BURSA W/O US: CPT | Mod: RT,,, | Performed by: PHYSICIAN ASSISTANT

## 2024-08-23 RX ADMIN — BETAMETHASONE SODIUM PHOSPHATE AND BETAMETHASONE ACETATE 12 MG: 3; 3 INJECTION, SUSPENSION INTRA-ARTICULAR; INTRALESIONAL; INTRAMUSCULAR; SOFT TISSUE at 10:08

## 2024-08-23 RX ADMIN — LIDOCAINE HYDROCHLORIDE 2 MG: 20 INJECTION, SOLUTION INFILTRATION; PERINEURAL at 10:08

## 2024-08-23 NOTE — PROCEDURES
Large Joint Aspiration/Injection: R knee    Date/Time: 8/23/2024 10:45 AM    Performed by: John Domingo PA-C  Authorized by: John Domingo PA-C    Consent Done?:  Yes (Verbal)  Indications:  Arthritis  Timeout: prior to procedure the correct patient, procedure, and site was verified    Prep: patient was prepped and draped in usual sterile fashion      Local anesthesia used?: Yes    Local anesthetic:  Lidocaine 2% without epinephrine    Details:  Needle Size:  25 G  Ultrasonic Guidance for needle placement?: No    Location:  Knee  Site:  R knee  Medications:  12 mg betamethasone acetate-betamethasone sodium phosphate 6 mg/mL; 2 mg LIDOcaine HCL 20 mg/ml (2%) 20 mg/mL (2 %)  Patient tolerance:  Patient tolerated the procedure well with no immediate complications

## 2024-09-27 ENCOUNTER — OFFICE VISIT (OUTPATIENT)
Dept: ORTHOPEDICS | Facility: CLINIC | Age: 79
End: 2024-09-27
Payer: COMMERCIAL

## 2024-09-27 VITALS
WEIGHT: 175.25 LBS | DIASTOLIC BLOOD PRESSURE: 83 MMHG | HEIGHT: 59 IN | HEART RATE: 76 BPM | SYSTOLIC BLOOD PRESSURE: 132 MMHG | BODY MASS INDEX: 35.33 KG/M2

## 2024-09-27 DIAGNOSIS — M17.11 PRIMARY OSTEOARTHRITIS OF RIGHT KNEE: Primary | ICD-10-CM

## 2024-09-27 PROCEDURE — 99213 OFFICE O/P EST LOW 20 MIN: CPT | Mod: ,,, | Performed by: PHYSICIAN ASSISTANT

## 2024-09-27 PROCEDURE — 1101F PT FALLS ASSESS-DOCD LE1/YR: CPT | Mod: CPTII,,, | Performed by: PHYSICIAN ASSISTANT

## 2024-09-27 PROCEDURE — 3288F FALL RISK ASSESSMENT DOCD: CPT | Mod: CPTII,,, | Performed by: PHYSICIAN ASSISTANT

## 2024-09-27 PROCEDURE — 1160F RVW MEDS BY RX/DR IN RCRD: CPT | Mod: CPTII,,, | Performed by: PHYSICIAN ASSISTANT

## 2024-09-27 PROCEDURE — 1159F MED LIST DOCD IN RCRD: CPT | Mod: CPTII,,, | Performed by: PHYSICIAN ASSISTANT

## 2024-09-27 PROCEDURE — 3075F SYST BP GE 130 - 139MM HG: CPT | Mod: CPTII,,, | Performed by: PHYSICIAN ASSISTANT

## 2024-09-27 PROCEDURE — 3079F DIAST BP 80-89 MM HG: CPT | Mod: CPTII,,, | Performed by: PHYSICIAN ASSISTANT

## 2024-09-30 ENCOUNTER — LAB VISIT (OUTPATIENT)
Dept: LAB | Facility: HOSPITAL | Age: 79
End: 2024-09-30
Attending: INTERNAL MEDICINE
Payer: MEDICARE

## 2024-09-30 DIAGNOSIS — I10 ESSENTIAL HYPERTENSION, MALIGNANT: ICD-10-CM

## 2024-09-30 DIAGNOSIS — I25.118 ATHSCL HEART DISEASE OF NATIVE COR ART W OTH ANG PCTRS: Primary | ICD-10-CM

## 2024-09-30 DIAGNOSIS — E78.2 MIXED HYPERLIPIDEMIA: ICD-10-CM

## 2024-09-30 DIAGNOSIS — I50.42 CHRONIC COMBINED SYSTOLIC AND DIASTOLIC HEART FAILURE: ICD-10-CM

## 2024-09-30 LAB
ALBUMIN SERPL-MCNC: 3.9 G/DL (ref 3.4–4.8)
ALBUMIN/GLOB SERPL: 1.4 RATIO (ref 1.1–2)
ALP SERPL-CCNC: 38 UNIT/L (ref 40–150)
ALT SERPL-CCNC: 10 UNIT/L (ref 0–55)
ANION GAP SERPL CALC-SCNC: 8 MEQ/L
AST SERPL-CCNC: 16 UNIT/L (ref 5–34)
BILIRUB SERPL-MCNC: 0.6 MG/DL
BNP BLD-MCNC: 21.3 PG/ML
BUN SERPL-MCNC: 8.9 MG/DL (ref 9.8–20.1)
CALCIUM SERPL-MCNC: 9.9 MG/DL (ref 8.4–10.2)
CHLORIDE SERPL-SCNC: 100 MMOL/L (ref 98–107)
CHOLEST SERPL-MCNC: 171 MG/DL
CHOLEST/HDLC SERPL: 3 {RATIO} (ref 0–5)
CO2 SERPL-SCNC: 28 MMOL/L (ref 23–31)
CREAT SERPL-MCNC: 0.79 MG/DL (ref 0.55–1.02)
CREAT/UREA NIT SERPL: 11
GFR SERPLBLD CREATININE-BSD FMLA CKD-EPI: >60 ML/MIN/1.73/M2
GLOBULIN SER-MCNC: 2.7 GM/DL (ref 2.4–3.5)
GLUCOSE SERPL-MCNC: 99 MG/DL (ref 82–115)
HDLC SERPL-MCNC: 61 MG/DL (ref 35–60)
LDLC SERPL CALC-MCNC: 90 MG/DL (ref 50–140)
POTASSIUM SERPL-SCNC: 4.7 MMOL/L (ref 3.5–5.1)
PROT SERPL-MCNC: 6.6 GM/DL (ref 5.8–7.6)
SODIUM SERPL-SCNC: 136 MMOL/L (ref 136–145)
TRIGL SERPL-MCNC: 100 MG/DL (ref 37–140)
VLDLC SERPL CALC-MCNC: 20 MG/DL

## 2024-09-30 PROCEDURE — 36415 COLL VENOUS BLD VENIPUNCTURE: CPT

## 2024-09-30 PROCEDURE — 80053 COMPREHEN METABOLIC PANEL: CPT

## 2024-09-30 PROCEDURE — 80061 LIPID PANEL: CPT

## 2024-09-30 PROCEDURE — 83880 ASSAY OF NATRIURETIC PEPTIDE: CPT

## 2024-10-07 RX ORDER — LIDOCAINE HYDROCHLORIDE 20 MG/ML
2 INJECTION, SOLUTION INFILTRATION; PERINEURAL
Status: DISCONTINUED | OUTPATIENT
Start: 2024-08-23 | End: 2024-10-07 | Stop reason: HOSPADM

## 2024-10-07 RX ORDER — BETAMETHASONE SODIUM PHOSPHATE AND BETAMETHASONE ACETATE 3; 3 MG/ML; MG/ML
12 INJECTION, SUSPENSION INTRA-ARTICULAR; INTRALESIONAL; INTRAMUSCULAR; SOFT TISSUE
Status: DISCONTINUED | OUTPATIENT
Start: 2024-08-23 | End: 2024-10-07 | Stop reason: HOSPADM

## 2024-10-07 NOTE — PROGRESS NOTES
Chief Complaint:   Chief Complaint   Patient presents with    Knee Pain     Patient c/o R knee pain- ongoing since Wednesday. States she was rising from sitting and twisted it. Has been starting to use a walker. Reports a little bit of swelling. Pain on the medial aspect of the knee.        History of present illness:    This is a 79 y.o. year old female who complains of right knee pain.    Patient states he was started when she was getting out of a chair and she felt like she may have twisted it.    She states the pain was sudden and significant and she was here today for orthopedic evaluation      Current Outpatient Medications   Medication Sig    albuterol (VENTOLIN HFA) 90 mcg/actuation inhaler Inhale 2 puffs into the lungs every 6 (six) hours as needed for Wheezing. Rescue    aspirin (ECOTRIN) 81 MG EC tablet Take 81 mg by mouth once daily.    B6/folic/B12/coffee/phosphatid (NEURIVA PLUS BRAIN PERFORMANCE ORAL) Take by mouth.    benazepriL (LOTENSIN) 20 MG tablet Take 1 tablet (20 mg total) by mouth once daily.    calcium carbonate-vitamin D3 (CALCIUM 600 WITH VITAMIN D3) 600 mg-10 mcg (400 unit) Chew Take 1 tablet by mouth once daily.    coenzyme Q10 200 mg capsule Take 200 mg by mouth.    esomeprazole (NEXIUM) 40 MG capsule Take 1 capsule (40 mg total) by mouth before breakfast.    levothyroxine (SYNTHROID) 75 MCG tablet Take 1 tablet (75 mcg total) by mouth before breakfast.    LINZESS 145 mcg Cap capsule Take 145 mcg by mouth.    polyethylene glycol (GLYCOLAX) 17 gram/dose powder Take 17 g by mouth.    rosuvastatin (CRESTOR) 10 MG tablet Take 1 tablet (10 mg total) by mouth once daily.    solifenacin (VESICARE) 10 MG tablet Take 1 tablet (10 mg total) by mouth once daily.    alendronate (FOSAMAX) 70 MG tablet Take 1 tablet (70 mg total) by mouth every 7 days.    amLODIPine (NORVASC) 10 MG tablet Take 1 tablet (10 mg total) by mouth once daily.    estradioL (ESTRACE) 0.01 % (0.1 mg/gram) vaginal cream Place  "0.5 g vaginally.     No current facility-administered medications for this visit.       Review of Systems:    Constitution:   Denies chills, fever, and sweats.  HENT:   Denies headaches or blurry vision.  Cardiovascular:  Denies chest pain or irregular heart beat.  Respiratory:   Denies cough or shortness of breath.  Gastrointestinal:  Denies abdominal pain, nausea, or vomiting.  Musculoskeletal:   Denies muscle cramps.  Neurological:   Denies dizziness or focal weakness.  Psychiatric/Behavior: Normal mental status.  Hematology/Lymph:  Denies bleeding problem or easy bruising/bleeding.  Skin:    Denies rash or suspicious lesions.    Examination:    Vital Signs:    Vitals:    24 1031   BP: 110/77   Pulse: 91   Weight: 79.5 kg (175 lb 4.3 oz)   Height: 4' 11" (1.499 m)       Body mass index is 35.4 kg/m².    Constitution:   Well-developed, well nourished patient in no acute distress.  Neurological:   Alert and oriented x 3 and cooperative to examination.     Psychiatric/Behavior: Normal mental status.  Respiratory:   No shortness of breath.  Eyes:    Extraoccular muscles intact  Skin:    No scars, rash or suspicious lesions.    Physical Exam:       General Musculoskeletal Exam   Gait: antalgic       Right Knee Exam     Inspection   Erythema: absent  Effusion: present  Deformity: present  Bruising: absent    Tenderness   The patient is tender to palpation of the lateral and patellofemoral joint line    Crepitus   The patient has crepitus with ROM    Range of Motion   Extension: abnormal   Flexion: abnormal   5-125    Tests   Meniscus   Mumtaz:  Negative  Ligament Examination   MCL - Valgus: normal (0 to 2mm)  LCL - Varus: normal  Patella   Passive Patellar Tilt: neutral    Other   Sensation: normal    Comments:  Valgus deformity    Muscle Strength   Right Lower Extremity   Quadriceps:  5/5   Hamstrin/5     Vascular Exam     Right Pulses  Dorsalis Pedis:      2+  Posterior Tibial:      2+      Imaging: " X-rays ordered and images interpreted today personally by me of right knee four views   Patient does have significant arthritis of the right knee with near bone-on-bone contact of the lateral compartment with valgus alignment        Assessment: Right knee pain, unspecified chronicity  -     X-Ray Knee Complete 4 Or More Views Right; Future; Expected date: 08/23/2024  -     Large Joint Aspiration/Injection: R knee    Primary osteoarthritis of right knee         Plan:  At this point we will try an injection the patient was knee.    She will modify her activities and use over-the-counter anti-inflammatories   Follow up in a month if her pain persists    Large Joint Aspiration/Injection: R knee    Date/Time: 8/23/2024 10:45 AM    Performed by: John Domingo PA-C  Authorized by: John Domingo PA-C    Consent Done?:  Yes (Verbal)  Indications:  Arthritis  Timeout: prior to procedure the correct patient, procedure, and site was verified    Prep: patient was prepped and draped in usual sterile fashion      Local anesthesia used?: Yes    Local anesthetic:  Lidocaine 2% without epinephrine    Details:  Needle Size:  25 G  Ultrasonic Guidance for needle placement?: No    Location:  Knee  Site:  R knee  Medications:  12 mg betamethasone acetate-betamethasone sodium phosphate 6 mg/mL; 2 mg LIDOcaine HCL 20 mg/ml (2%) 20 mg/mL (2 %)  Patient tolerance:  Patient tolerated the procedure well with no immediate complications       DISCLAIMER: This note may have been dictated using voice recognition software and may contain grammatical errors.     NOTE: Consult report sent to referring provider via [employer].

## 2024-10-16 NOTE — PROGRESS NOTES
Chief Complaint:   Chief Complaint   Patient presents with    Follow-up     R knee f/u from injection on 08/23/24 with relief. No complaints        History of present illness:    This is a 79 y.o. year old female who complains of follow up from her right knee injection.    Patient was states within a couple of days her knee pain was completely dissipated I think an injection in his she was having no difficulty presently      Current Outpatient Medications   Medication Sig    albuterol (VENTOLIN HFA) 90 mcg/actuation inhaler Inhale 2 puffs into the lungs every 6 (six) hours as needed for Wheezing. Rescue    aspirin (ECOTRIN) 81 MG EC tablet Take 81 mg by mouth once daily.    B6/folic/B12/coffee/phosphatid (NEURIVA PLUS BRAIN PERFORMANCE ORAL) Take by mouth.    benazepriL (LOTENSIN) 20 MG tablet Take 1 tablet (20 mg total) by mouth once daily.    calcium carbonate-vitamin D3 (CALCIUM 600 WITH VITAMIN D3) 600 mg-10 mcg (400 unit) Chew Take 1 tablet by mouth once daily.    coenzyme Q10 200 mg capsule Take 200 mg by mouth.    levothyroxine (SYNTHROID) 75 MCG tablet Take 1 tablet (75 mcg total) by mouth before breakfast.    LINZESS 145 mcg Cap capsule Take 145 mcg by mouth.    polyethylene glycol (GLYCOLAX) 17 gram/dose powder Take 17 g by mouth.    rosuvastatin (CRESTOR) 10 MG tablet Take 1 tablet (10 mg total) by mouth once daily.    solifenacin (VESICARE) 10 MG tablet Take 1 tablet (10 mg total) by mouth once daily.    alendronate (FOSAMAX) 70 MG tablet Take 1 tablet (70 mg total) by mouth every 7 days.    amLODIPine (NORVASC) 10 MG tablet Take 1 tablet (10 mg total) by mouth once daily.    esomeprazole (NEXIUM) 40 MG capsule Take 1 capsule (40 mg total) by mouth before breakfast.    estradioL (ESTRACE) 0.01 % (0.1 mg/gram) vaginal cream Place 0.5 g vaginally.     No current facility-administered medications for this visit.       Review of Systems:    Constitution:   Denies chills, fever, and sweats.  HENT:   Denies  "headaches or blurry vision.  Cardiovascular:  Denies chest pain or irregular heart beat.  Respiratory:   Denies cough or shortness of breath.  Gastrointestinal:  Denies abdominal pain, nausea, or vomiting.  Musculoskeletal:   Denies muscle cramps.  Neurological:   Denies dizziness or focal weakness.  Psychiatric/Behavior: Normal mental status.  Hematology/Lymph:  Denies bleeding problem or easy bruising/bleeding.  Skin:    Denies rash or suspicious lesions.    Examination:    Vital Signs:    Vitals:    24 0956   BP: 132/83   Pulse: 76   Weight: 79.5 kg (175 lb 4.3 oz)   Height: 4' 11" (1.499 m)       Body mass index is 35.4 kg/m².    Constitution:   Well-developed, well nourished patient in no acute distress.  Neurological:   Alert and oriented x 3 and cooperative to examination.     Psychiatric/Behavior: Normal mental status.  Respiratory:   No shortness of breath.  Eyes:    Extraoccular muscles intact  Skin:    No scars, rash or suspicious lesions.    Physical Exam:   Right Knee Exam     Inspection   Erythema: absent  Effusion:  Absent  Deformity: present  Bruising: absent    Tenderness   The patient is tender to palpation of the lateral and patellofemoral joint line    Crepitus   The patient has crepitus with ROM    Range of Motion   Extension: abnormal   Flexion: abnormal   5-125    Tests   Meniscus   Mumtaz:  Negative  Ligament Examination   MCL - Valgus: normal (0 to 2mm)  LCL - Varus: normal  Patella   Passive Patellar Tilt: neutral    Other   Sensation: normal    Comments:  Valgus deformity    Muscle Strength   Right Lower Extremity   Quadriceps:  5/5   Hamstrin/5              Assessment: Primary osteoarthritis of right knee         Plan:  This point the patient will follow up as needed.    We can repeat injections if her pain returns.    She will call the office          DISCLAIMER: This note may have been dictated using voice recognition software and may contain grammatical errors.     NOTE: " Consult report sent to referring provider via [employer] EMR.

## 2024-10-28 PROBLEM — Z00.00 WELLNESS EXAMINATION: Status: RESOLVED | Noted: 2024-07-29 | Resolved: 2024-10-28

## 2025-02-05 ENCOUNTER — OFFICE VISIT (OUTPATIENT)
Dept: FAMILY MEDICINE | Facility: CLINIC | Age: 80
End: 2025-02-05
Payer: MEDICARE

## 2025-02-05 ENCOUNTER — TELEPHONE (OUTPATIENT)
Dept: FAMILY MEDICINE | Facility: CLINIC | Age: 80
End: 2025-02-05

## 2025-02-05 ENCOUNTER — LAB VISIT (OUTPATIENT)
Dept: LAB | Facility: HOSPITAL | Age: 80
End: 2025-02-05
Payer: MEDICARE

## 2025-02-05 VITALS
OXYGEN SATURATION: 97 % | DIASTOLIC BLOOD PRESSURE: 72 MMHG | SYSTOLIC BLOOD PRESSURE: 128 MMHG | RESPIRATION RATE: 18 BRPM | BODY MASS INDEX: 34.57 KG/M2 | TEMPERATURE: 98 F | HEART RATE: 98 BPM | HEIGHT: 59 IN | WEIGHT: 171.5 LBS

## 2025-02-05 DIAGNOSIS — E03.9 ACQUIRED HYPOTHYROIDISM: ICD-10-CM

## 2025-02-05 DIAGNOSIS — B30.9 VIRAL CONJUNCTIVITIS: ICD-10-CM

## 2025-02-05 DIAGNOSIS — I50.42 CHRONIC COMBINED SYSTOLIC AND DIASTOLIC HEART FAILURE: ICD-10-CM

## 2025-02-05 DIAGNOSIS — J06.9 UPPER RESPIRATORY TRACT INFECTION, UNSPECIFIED TYPE: Primary | ICD-10-CM

## 2025-02-05 DIAGNOSIS — K21.9 GASTROESOPHAGEAL REFLUX DISEASE WITHOUT ESOPHAGITIS: ICD-10-CM

## 2025-02-05 DIAGNOSIS — R05.9 COUGH, UNSPECIFIED TYPE: ICD-10-CM

## 2025-02-05 DIAGNOSIS — E78.2 MIXED HYPERLIPIDEMIA: ICD-10-CM

## 2025-02-05 DIAGNOSIS — R78.81 BACTEREMIA: ICD-10-CM

## 2025-02-05 DIAGNOSIS — I10 ESSENTIAL HYPERTENSION, MALIGNANT: ICD-10-CM

## 2025-02-05 DIAGNOSIS — E66.01 CLASS 2 SEVERE OBESITY DUE TO EXCESS CALORIES WITH SERIOUS COMORBIDITY AND BODY MASS INDEX (BMI) OF 35.0 TO 35.9 IN ADULT: ICD-10-CM

## 2025-02-05 DIAGNOSIS — E03.9 HYPOTHYROIDISM, UNSPECIFIED TYPE: ICD-10-CM

## 2025-02-05 DIAGNOSIS — I10 PRIMARY HYPERTENSION: ICD-10-CM

## 2025-02-05 DIAGNOSIS — R06.02 SHORTNESS OF BREATH: ICD-10-CM

## 2025-02-05 DIAGNOSIS — E66.812 CLASS 2 SEVERE OBESITY DUE TO EXCESS CALORIES WITH SERIOUS COMORBIDITY AND BODY MASS INDEX (BMI) OF 35.0 TO 35.9 IN ADULT: ICD-10-CM

## 2025-02-05 DIAGNOSIS — E55.9 VITAMIN D DEFICIENCY: ICD-10-CM

## 2025-02-05 DIAGNOSIS — M81.0 OSTEOPOROSIS, UNSPECIFIED OSTEOPOROSIS TYPE, UNSPECIFIED PATHOLOGICAL FRACTURE PRESENCE: ICD-10-CM

## 2025-02-05 DIAGNOSIS — I25.118 ATHSCL HEART DISEASE OF NATIVE COR ART W OTH ANG PCTRS: Primary | ICD-10-CM

## 2025-02-05 LAB
B PERT.PT PRMT NPH QL NAA+NON-PROBE: NOT DETECTED
C PNEUM DNA NPH QL NAA+NON-PROBE: NOT DETECTED
FLUAV AG UPPER RESP QL IA.RAPID: NOT DETECTED
FLUBV AG UPPER RESP QL IA.RAPID: NOT DETECTED
HADV DNA NPH QL NAA+NON-PROBE: NOT DETECTED
HCOV 229E RNA NPH QL NAA+NON-PROBE: NOT DETECTED
HCOV HKU1 RNA NPH QL NAA+NON-PROBE: NOT DETECTED
HCOV NL63 RNA NPH QL NAA+NON-PROBE: NOT DETECTED
HCOV OC43 RNA NPH QL NAA+NON-PROBE: NOT DETECTED
HMPV RNA NPH QL NAA+NON-PROBE: NOT DETECTED
HPIV1 RNA NPH QL NAA+NON-PROBE: NOT DETECTED
HPIV2 RNA NPH QL NAA+NON-PROBE: NOT DETECTED
HPIV3 RNA NPH QL NAA+NON-PROBE: NOT DETECTED
HPIV4 RNA NPH QL NAA+NON-PROBE: NOT DETECTED
M PNEUMO DNA NPH QL NAA+NON-PROBE: NOT DETECTED
RSV A 5' UTR RNA NPH QL NAA+PROBE: NOT DETECTED
RSV RNA NPH QL NAA+NON-PROBE: NOT DETECTED
RV+EV RNA NPH QL NAA+NON-PROBE: NOT DETECTED
SARS-COV-2 RNA RESP QL NAA+PROBE: NOT DETECTED

## 2025-02-05 PROCEDURE — 99214 OFFICE O/P EST MOD 30 MIN: CPT | Mod: ,,,

## 2025-02-05 PROCEDURE — 0241U COVID/RSV/FLU A&B PCR: CPT

## 2025-02-05 RX ORDER — AZITHROMYCIN 1 G/1
1 POWDER, FOR SUSPENSION ORAL ONCE
Qty: 1 PACKET | Refills: 0 | Status: SHIPPED | OUTPATIENT
Start: 2025-02-05 | End: 2025-02-05

## 2025-02-05 RX ORDER — ALBUTEROL SULFATE 90 UG/1
2 INHALANT RESPIRATORY (INHALATION) EVERY 6 HOURS PRN
Qty: 18 G | Refills: 3 | Status: SHIPPED | OUTPATIENT
Start: 2025-02-05

## 2025-02-05 RX ORDER — OLOPATADINE HYDROCHLORIDE 1 MG/ML
1 SOLUTION/ DROPS OPHTHALMIC 2 TIMES DAILY
Qty: 5 ML | Refills: 1 | Status: SHIPPED | OUTPATIENT
Start: 2025-02-05 | End: 2026-02-05

## 2025-02-05 RX ORDER — AZITHROMYCIN 250 MG/1
250 TABLET, FILM COATED ORAL DAILY
Qty: 6 TABLET | Refills: 0 | Status: SHIPPED | OUTPATIENT
Start: 2025-02-05

## 2025-02-05 RX ORDER — BENZONATATE 100 MG/1
100 CAPSULE ORAL 3 TIMES DAILY PRN
Qty: 30 CAPSULE | Refills: 1 | Status: SHIPPED | OUTPATIENT
Start: 2025-02-05 | End: 2025-02-15

## 2025-02-05 RX ORDER — AZITHROMYCIN 250 MG/1
250 TABLET, FILM COATED ORAL DAILY
COMMUNITY
End: 2025-02-05 | Stop reason: SDUPTHER

## 2025-02-05 RX ORDER — ALENDRONATE SODIUM 70 MG/1
70 TABLET ORAL
Qty: 12 TABLET | Refills: 3 | Status: SHIPPED | OUTPATIENT
Start: 2025-02-05 | End: 2026-02-05

## 2025-02-05 NOTE — TELEPHONE ENCOUNTER
----- Message from Andreia sent at 2/5/2025  3:24 PM CST -----  Regarding: phar  .Type:  Pharmacy Calling to Clarify an RX    Name of Caller:marc chauhan   Pharmacy Name:  Prescription Name:  What do they need to clarify?:Dosage size - azithromycin (ZITHROMAX) 1 gram Pack  Best Call Back Number:[id number] - [place], [place] - [address] AT [address]      Additional Information: [phone]

## 2025-02-05 NOTE — TELEPHONE ENCOUNTER
----- Message from Nurse Marybel sent at 2/5/2025  9:20 AM CST -----  Can you please schedule patient a telemed or in person maybe with Jania? Last seen in July  ----- Message -----  From: Zahra Johnson  Sent: 2/5/2025   9:10 AM CST  To: Margarita MANJARREZ Staff    .Who Called: Mary Lou Grajeda    Caller is requesting assistance/information from provider's office.    Symptoms (please be specific): head and chest congestion   How long has patient had these symptoms:  over a week   List of preferred pharmacies on file (remove unneeded): [facility]     Preferred Method of Contact: Phone Call  Patient's Preferred Phone Number on File: [phone]   Best Call Back Number, if different:  Additional Information: Caller is requesting a medication is sent over to the pharmacy for the following symptoms. Please call to advise.

## 2025-02-05 NOTE — PROGRESS NOTES
Family Medicine  Patient ID: [id number]     Chief Complaint: Nasal Congestion      HPI:     Mary Lou Grajeda is a 79 y.o. female here today for a problem visit. Complaining of chest congestion, nasal blockage, post nasal drip, productive cough, shortness of breath, sinus and nasal congestion, bilateral sinus pain, and sore throat  x 3 days.  Denies fever, body aches, chills, difficulty breathing, or CP. Admits to no known sick contacts. . No other complaints today. She has been taking zyrtec, combination sinus medication, robitussin with a little relief. She also complains of her right eye being red, crusting over in the mornings, no pain, no changes in vision. She also complains of shortness of breath that started yesterday. She had childhood asthma. She was given PRN Ventolin for URI in past but had no refills so she was not taking.     Past Medical History:   Diagnosis Date    Arteriosclerosis of coronary artery 07/25/2022    Chronic constipation 07/25/2022    Gastroesophageal reflux disease 07/25/2022    History of breast cancer 07/25/2022    Hyperlipidemia 07/25/2022    Hypertension     Osteoporosis     Overactive bladder 07/25/2022    Personal history of colonic polyps 08/24/2021    COLONOSCOPY        Past Surgical History:   Procedure Laterality Date    COLONOSCOPY  08/24/2021    Andra Fine III, MD        Social History     Tobacco Use    Smoking status: Never    Smokeless tobacco: Never   Substance and Sexual Activity    Alcohol use: Never    Drug use: Never    Sexual activity: Not on file        Current Outpatient Medications   Medication Instructions    albuterol (VENTOLIN HFA) 90 mcg/actuation inhaler 2 puffs, Inhalation, Every 6 hours PRN, Rescue    alendronate (FOSAMAX) 70 mg, Oral, Every 7 days    amLODIPine (NORVASC) 10 mg, Oral, Daily    aspirin (ECOTRIN) 81 mg, Daily    azithromycin (ZITHROMAX) 1 g, Oral, Once    B6/folic/B12/coffee/phosphatid (NEURIVA PLUS BRAIN PERFORMANCE ORAL) Take by mouth.     benazepriL (LOTENSIN) 20 mg, Oral, Daily    benzonatate (TESSALON) 100 mg, Oral, 3 times daily PRN    calcium carbonate-vitamin D3 (CALCIUM 600 WITH VITAMIN D3) 600 mg-10 mcg (400 unit) Chew 1 tablet, Daily    coenzyme Q10 200 mg    esomeprazole (NEXIUM) 40 mg, Oral, Before breakfast    estradioL (ESTRACE) 0.5 g    levothyroxine (SYNTHROID) 75 mcg, Oral, Before breakfast    LINZESS 145 mcg    olopatadine (PATANOL) 0.1 % ophthalmic solution 1 drop, Both Eyes, 2 times daily    polyethylene glycol (GLYCOLAX) 17 g    rosuvastatin (CRESTOR) 10 mg, Oral, Daily    solifenacin (VESICARE) 10 mg, Oral, Daily       Review of patient's allergies indicates:   Allergen Reactions    Prochlorperazine     Propoxyphene-asa-caffeine     Propoxyphene Nausea Only        Patient Care Team:  Anitha Avila MD as PCP - General (Family Medicine)  Anitha Avila MD Noel, Jacque F III, MD as Consulting Physician (Gastroenterology)  Franc Bal MD as Consulting Physician (Orthopedic Surgery)  Jan Younger MD as Consulting Physician (Cardiology)  Anitha Manjarrez MD as Obstetrician (Obstetrics)     Subjective:     Review of Systems   Constitutional: Negative.    HENT:  Positive for congestion, postnasal drip, sinus pressure, sinus pain and sore throat.    Eyes:  Positive for redness.   Respiratory:  Positive for cough and shortness of breath.    Cardiovascular: Negative.    Gastrointestinal: Negative.    Endocrine: Negative.    Genitourinary: Negative.    Musculoskeletal: Negative.  Negative for myalgias.   Skin: Negative.    Allergic/Immunologic: Negative.    Neurological: Negative.    Hematological: Negative.    Psychiatric/Behavioral: Negative.         12 point review of systems conducted, negative except as stated in the history of present illness. See HPI for details.    Objective:     Visit Vitals  /72 (BP Location: Left arm, Patient Position: Sitting)   Pulse 98   Temp 98.3 °F (36.8 °C) (Temporal)  "  Resp 18   Ht 4' 11" (1.499 m)   Wt 77.8 kg (171 lb 8 oz)   LMP  (LMP Unknown)   SpO2 97%   BMI 34.64 kg/m²       Physical Exam  Vitals and nursing note reviewed.   Constitutional:       General: She is not in acute distress.     Appearance: Normal appearance. She is obese. She is not toxic-appearing.   HENT:      Head: Normocephalic and atraumatic.      Right Ear: Tympanic membrane normal.      Left Ear: Tympanic membrane normal.      Nose: Congestion and rhinorrhea present.   Eyes:      Extraocular Movements: Extraocular movements intact.   Cardiovascular:      Rate and Rhythm: Normal rate and regular rhythm.   Pulmonary:      Effort: Pulmonary effort is normal. No respiratory distress.      Breath sounds: Normal breath sounds. No wheezing, rhonchi or rales.   Musculoskeletal:         General: Normal range of motion.   Skin:     General: Skin is warm.   Neurological:      General: No focal deficit present.      Mental Status: She is alert and oriented to person, place, and time.         Assessment:       ICD-10-CM ICD-9-CM   1. Upper respiratory tract infection, unspecified type  J06.9 465.9   2. Cough, unspecified type  R05.9 786.2   3. Shortness of breath  R06.02 786.05   4. Viral conjunctivitis  B30.9 077.99   5. Osteoporosis, unspecified osteoporosis type, unspecified pathological fracture presence  M81.0 733.00   6. Class 2 severe obesity due to excess calories with serious comorbidity and body mass index (BMI) of 35.0 to 35.9 in adult  E66.812 278.01    E66.01 V85.35    Z68.35         Plan:     1. Upper respiratory tract infection, unspecified type  Assessment & Plan:  Viral resp panel ordered, will follow up with results.  Albuterol inhaler PRN  Zpack  Tessalon perrles  Instructed she can continue over the counter medications as needed.  Reviewed ER precautions with pt such as severe SOB.     Orders:  -     albuterol (VENTOLIN HFA) 90 mcg/actuation inhaler; Inhale 2 puffs into the lungs every 6 (six) hours " as needed for Wheezing. Rescue  Dispense: 18 g; Refill: 3  -     azithromycin (ZITHROMAX) 1 gram Pack; Take 1 g by mouth once. for 1 dose  Dispense: 1 packet; Refill: 0  -     Respiratory Panel; Future; Expected date: 02/05/2025    2. Cough, unspecified type  Assessment & Plan:  Productive cough  OTC meds helped a little  Sent in tessalon perrles    Orders:  -     benzonatate (TESSALON) 100 MG capsule; Take 1 capsule (100 mg total) by mouth 3 (three) times daily as needed for Cough.  Dispense: 30 capsule; Refill: 1    3. Shortness of breath  Assessment & Plan:  On exertion  Instructed to rest  Refilled Ventolin inhaler PRN  ER precautions reviewed      4. Viral conjunctivitis  Assessment & Plan:  Sent in pataday drops  Instructed her to apply twice a day  Can do warm towel compresses BID PRN    Orders:  -     olopatadine (PATANOL) 0.1 % ophthalmic solution; Place 1 drop into both eyes 2 (two) times daily.  Dispense: 5 mL; Refill: 1    5. Osteoporosis, unspecified osteoporosis type, unspecified pathological fracture presence  Assessment & Plan:  Stable on fosamax, refills sent. DEXA due 3/2025.    Orders:  -     alendronate (FOSAMAX) 70 MG tablet; Take 1 tablet (70 mg total) by mouth every 7 days.  Dispense: 12 tablet; Refill: 3    6. Class 2 severe obesity due to excess calories with serious comorbidity and body mass index (BMI) of 35.0 to 35.9 in adult  Assessment & Plan:  Encouraged lifestyle change.            Start  Use OTC cold meds for symptoms (HTN and DM pt to avoid Sudafed or pseudoephedrine products).  Use OTC Tylenol or Advil for fever or body aches.  Get plenty of rest, eat a healthy diet, avoid alcohol and smoking.  Sore Throat: Use OTC lozenges or throat sprays, gargle with warm salt and water, warm tea with honey.  Nasal Congestion: Use OTC Mucinex D, humidifier or steamy shower.  Cough: Use Dextromethorphan/Doxylamine Cough Syrup at night.  Report fever greater than 101 F, breathing problems, painful  or worsening cough, or changes in mucous (green, yellow, bloody).  Cover your mouth with coughing, avoid sharing cups or lip balm, wash your hand before eating or touching your face.      Follow up if symptoms worsen or fail to improve. In addition to their scheduled follow up, the patient has also been instructed to follow up on as needed basis.     Future Appointments   Date Time Provider Department Center   3/31/2025  1:30 PM Anitha Avila MD [facility] [place]   8/6/2025  1:00 PM Anitha Avila MD [facility] [place] Chanda Babineaux, FNP

## 2025-02-05 NOTE — ASSESSMENT & PLAN NOTE
Viral resp panel ordered, will follow up with results.  Albuterol inhaler PRN  Zpack  Tessalon perrles  Instructed she can continue over the counter medications as needed.  Reviewed ER precautions with pt such as severe SOB.

## 2025-02-06 ENCOUNTER — TELEPHONE (OUTPATIENT)
Dept: FAMILY MEDICINE | Facility: CLINIC | Age: 80
End: 2025-02-06
Payer: MEDICARE

## 2025-02-06 NOTE — TELEPHONE ENCOUNTER
Called patient back she said to disregard message, as she read her script wrong. I confirmed directions with her. She verbalized understanding

## 2025-02-06 NOTE — TELEPHONE ENCOUNTER
----- Message from Michelle sent at 2/6/2025  1:01 PM CST -----  Who Called: Mary Lou Grajeda    Caller is requesting assistance/information from provider's office.    Symptoms (please be specific):    How long has patient had these symptoms:    List of preferred pharmacies on file (remove unneeded): [unfilled]  If different, enter pharmacy into here including location and phone number:         Patient's Preferred Phone Number on File: [phone]   Best Call Back Number, if different:  Additional Information: called to confirm the dose and instructions are correct for ,  benzonatate (TESSALON) 100 MG capsule

## 2025-02-24 RX ORDER — LEVOTHYROXINE SODIUM 75 UG/1
75 TABLET ORAL
Qty: 90 TABLET | Refills: 3 | Status: SHIPPED | OUTPATIENT
Start: 2025-02-24

## 2025-03-06 DIAGNOSIS — I10 PRIMARY HYPERTENSION: ICD-10-CM

## 2025-03-06 RX ORDER — BENAZEPRIL HYDROCHLORIDE 20 MG/1
20 TABLET ORAL
Qty: 90 TABLET | Refills: 3 | Status: SHIPPED | OUTPATIENT
Start: 2025-03-06

## 2025-03-31 ENCOUNTER — OFFICE VISIT (OUTPATIENT)
Dept: FAMILY MEDICINE | Facility: CLINIC | Age: 80
End: 2025-03-31
Payer: MEDICARE

## 2025-03-31 VITALS
HEART RATE: 74 BPM | RESPIRATION RATE: 16 BRPM | WEIGHT: 168.63 LBS | TEMPERATURE: 98 F | HEIGHT: 59 IN | OXYGEN SATURATION: 98 % | BODY MASS INDEX: 34 KG/M2 | SYSTOLIC BLOOD PRESSURE: 126 MMHG | DIASTOLIC BLOOD PRESSURE: 84 MMHG

## 2025-03-31 DIAGNOSIS — I10 PRIMARY HYPERTENSION: Primary | ICD-10-CM

## 2025-03-31 DIAGNOSIS — R41.3 MEMORY CHANGE: ICD-10-CM

## 2025-03-31 DIAGNOSIS — R41.3 OTHER AMNESIA: ICD-10-CM

## 2025-03-31 DIAGNOSIS — E78.2 MIXED HYPERLIPIDEMIA: ICD-10-CM

## 2025-03-31 DIAGNOSIS — Z00.00 WELLNESS EXAMINATION: ICD-10-CM

## 2025-03-31 DIAGNOSIS — E66.811 CLASS 1 OBESITY DUE TO EXCESS CALORIES WITH SERIOUS COMORBIDITY AND BODY MASS INDEX (BMI) OF 34.0 TO 34.9 IN ADULT: ICD-10-CM

## 2025-03-31 DIAGNOSIS — R79.9 ABNORMAL FINDING OF BLOOD CHEMISTRY, UNSPECIFIED: ICD-10-CM

## 2025-03-31 DIAGNOSIS — N32.81 OVERACTIVE BLADDER: ICD-10-CM

## 2025-03-31 DIAGNOSIS — E66.09 CLASS 1 OBESITY DUE TO EXCESS CALORIES WITH SERIOUS COMORBIDITY AND BODY MASS INDEX (BMI) OF 34.0 TO 34.9 IN ADULT: ICD-10-CM

## 2025-03-31 DIAGNOSIS — E55.9 VITAMIN D DEFICIENCY: ICD-10-CM

## 2025-03-31 DIAGNOSIS — E03.9 ACQUIRED HYPOTHYROIDISM: ICD-10-CM

## 2025-03-31 PROBLEM — E66.812 CLASS 2 SEVERE OBESITY DUE TO EXCESS CALORIES WITH SERIOUS COMORBIDITY AND BODY MASS INDEX (BMI) OF 35.0 TO 35.9 IN ADULT: Status: RESOLVED | Noted: 2024-07-30 | Resolved: 2025-03-31

## 2025-03-31 PROBLEM — E66.01 CLASS 2 SEVERE OBESITY DUE TO EXCESS CALORIES WITH SERIOUS COMORBIDITY AND BODY MASS INDEX (BMI) OF 35.0 TO 35.9 IN ADULT: Status: RESOLVED | Noted: 2024-07-30 | Resolved: 2025-03-31

## 2025-03-31 PROBLEM — J06.9 UPPER RESPIRATORY INFECTION: Status: RESOLVED | Noted: 2025-02-05 | Resolved: 2025-03-31

## 2025-03-31 PROBLEM — R05.9 COUGH: Status: RESOLVED | Noted: 2025-02-05 | Resolved: 2025-03-31

## 2025-03-31 PROBLEM — B30.9 VIRAL CONJUNCTIVITIS: Status: RESOLVED | Noted: 2025-02-05 | Resolved: 2025-03-31

## 2025-03-31 PROBLEM — R06.02 SHORTNESS OF BREATH: Status: RESOLVED | Noted: 2023-02-28 | Resolved: 2025-03-31

## 2025-03-31 RX ORDER — SOLIFENACIN SUCCINATE 10 MG/1
10 TABLET, FILM COATED ORAL DAILY
Qty: 90 TABLET | Refills: 3 | Status: SHIPPED | OUTPATIENT
Start: 2025-03-31 | End: 2026-03-31

## 2025-03-31 NOTE — ASSESSMENT & PLAN NOTE
BP at goal  Continue current medication (Benazepril and Norvasc)   Daily BP check; bring log all clinic visits  Instructed to report to ED for any BP greater than 200/100, dizziness, syncope, CP, or SOB

## 2025-03-31 NOTE — PROGRESS NOTES
Subjective:        Patient ID: Mary Lou Grajeda is a 79 y.o. female.    Chief Complaint: Follow-up (6 month follow up /Patient reports c/o memory changes )      presents to the clinic unaccompanied for chronic condition follow up. She is due for her wellness visit in july.         Concerned for memory problems.  Is on prevagen or neuriva. Is not helping.  She will get up and forget what she had to do.  If someone tells her more than 2 things she forgets.   No one has mentioned concerns of her memory to her.  Mother has memory issues as well.           The patient has hypertension, HLD, carotid artery stenosis, and CAD and is compliant with her medications which include asa, Crestor, CoQ10 and red yeast rice. Has not been taking norvasc and benazepril depending on her day.  Bp today is on no meds. She denies any chest pain shortness of breath or weakness. Dr. Younger is her cardiologist. carotid us with him 7/2022.   has appt with him 9/2023.      She has hypothyroidism and is compliant with her Synthroid.      She has constipation and is on linzess 3x/week. Has GERD and is on nexium. GI is dr. ramon. doing some pelvic floor PT at [facility].        She is obese     she has OAB and on vesicare.  she also has a pessary and has seen dr. velma del angel 12/2021.  He retired.  She has appointment with new MD- dr. Salas     Had [facility] 7/2022.  End of July had freezer that leaked freon caused her to have chest pain.      Her mammograms were ordered by Dr. Hassan due to her history of breast cancer which was dx in 1994 and treated with chemo and radiation as well as tamoxifen x 5 years. Her last appt with her was 2/2019. She discharged her and has her to return only prn. Last mmg at [facility] 3/2023 was normal. She ordered her DEXA scans as well. Last 3/2022. She does them at [facility] on [address]. She has osteopenia and is on calcium and vitamin d. she also reports a history of low vit d. Her Pap smears are done with her GYN, Dr. Manjarrez. Saw  "her 9/2022. Her last colonoscopy was done in 8/2021 with Dr. Fine. recommended repeat in 5 years. copy in chart.     She had right cataract with dr. Rhodes on 1/24/23.       The patient had her right hip replacement done on November 27, 2018, with Dr. Bal. She also had a right radial head fx from fall 2/2021. saw dr. travis. did not need surgery.        Is ALLERGIC to Compazine and Darvon. She drinks alcohol rarely. She does not smoke. completed covid series. utd on pneumovax and tdap. Got shingrix at pharmacy        Review of Systems   Constitutional: Negative.    HENT: Negative.     Respiratory: Negative.     Cardiovascular: Negative.    Gastrointestinal: Negative.    Genitourinary: Negative.    Psychiatric/Behavioral:  Negative for agitation, behavioral problems, confusion, decreased concentration, dysphoric mood, hallucinations, self-injury, sleep disturbance and suicidal ideas. The patient is not nervous/anxious and is not hyperactive.          Review of patient's allergies indicates:   Allergen Reactions    Prochlorperazine     Propoxyphene-asa-caffeine     Propoxyphene Nausea Only      Vitals:    03/31/25 1323   BP: 126/84   BP Location: Left arm   Patient Position: Sitting   Pulse: 74   Resp: 16   Temp: 98 °F (36.7 °C)   TempSrc: Oral   SpO2: 98%   Weight: 76.5 kg (168 lb 9.6 oz)   Height: 4' 11" (1.499 m)      Social History     Socioeconomic History    Marital status:    Tobacco Use    Smoking status: Never    Smokeless tobacco: Never   Substance and Sexual Activity    Alcohol use: Never    Drug use: Never   Social History Narrative    ** Merged History Encounter **           No family history on file.       Objective:     Physical Exam  Vitals and nursing note reviewed.   Constitutional:       Appearance: Normal appearance. She is obese.   HENT:      Head: Normocephalic and atraumatic.      Nose: Nose normal.      Mouth/Throat:      Mouth: Mucous membranes are moist.      Pharynx: Oropharynx " is clear.   Eyes:      Extraocular Movements: Extraocular movements intact.   Cardiovascular:      Rate and Rhythm: Normal rate and regular rhythm.      Pulses: Normal pulses.      Heart sounds: Normal heart sounds.   Pulmonary:      Effort: Pulmonary effort is normal.      Breath sounds: Normal breath sounds.   Musculoskeletal:         General: Normal range of motion.      Cervical back: Normal range of motion.   Skin:     General: Skin is warm and dry.   Neurological:      General: No focal deficit present.      Mental Status: She is alert and oriented to person, place, and time. Mental status is at baseline.   Psychiatric:         Mood and Affect: Mood normal.       Medications Ordered Prior to Encounter[1]  Health Maintenance   Topic Date Due    COVID-19 Vaccine (6 - 2024-25 season) 09/01/2024    DEXA Scan  03/25/2025    Shingles Vaccine (3 of 3) 07/30/2025 (Originally 6/8/2022)    Aspirin/Antiplatelet Therapy  02/05/2026    Lipid Panel  09/30/2029    TETANUS VACCINE  08/18/2031    Hepatitis C Screening  Completed    RSV Vaccine (Age 60+ and Pregnant patients)  Completed    Pneumococcal Vaccines (Age 50+)  Completed    Influenza Vaccine  Addressed      Results for orders placed or performed in visit on 02/05/25   COVID/RSV/FLU A&B PCR    Collection Time: 02/05/25  2:24 PM   Result Value Ref Range    Influenza A PCR Not Detected Not Detected    Influenza B PCR Not Detected Not Detected    Respiratory Syncytial Virus PCR Not Detected Not Detected    SARS-CoV-2 PCR Not Detected Not Detected, Negative          Assessment & Plan:     Active Problem List with Overview Notes    Diagnosis Date Noted    Memory change 03/31/2025    Class 1 obesity due to excess calories with serious comorbidity and body mass index (BMI) of 34.0 to 34.9 in adult 03/31/2025    Osteoporosis 02/05/2025    Aortic atherosclerosis 07/30/2024    Postmenopausal 07/25/2022    History of breast cancer 07/25/2022    Overactive bladder 07/25/2022     Arthritis of knee 07/25/2022    Arteriosclerosis of coronary artery 07/25/2022    Chronic constipation 07/25/2022    Gastroesophageal reflux disease 07/25/2022    Hyperlipidemia 07/25/2022    Hypertension 07/25/2022    Hypothyroidism 07/25/2022    Osteopenia 07/25/2022    Tetanus, diphtheria, and acellular pertussis (Tdap) vaccination declined 07/25/2022    Vitamin D deficiency 07/25/2022    Advanced care planning/counseling discussion 07/25/2022       1. Primary hypertension  Assessment & Plan:  BP at goal  Continue current medication (Benazepril and Norvasc)   Daily BP check; bring log all clinic visits  Instructed to report to ED for any BP greater than 200/100, dizziness, syncope, CP, or SOB        Orders:  -     CBC Auto Differential; Future; Expected date: 09/30/2025  -     Comprehensive Metabolic Panel; Future; Expected date: 09/30/2025  -     Lipid Panel; Future; Expected date: 09/30/2025  -     TSH; Future; Expected date: 09/30/2025  -     Hemoglobin A1C; Future; Expected date: 09/30/2025  -     Urinalysis; Future; Expected date: 09/30/2025  -     Vitamin D; Future; Expected date: 09/30/2025    2. Mixed hyperlipidemia  Assessment & Plan:  Stable  Continue Statin as prescribed  Keep appt with PCP for follow up          Orders:  -     CBC Auto Differential; Future; Expected date: 09/30/2025  -     Comprehensive Metabolic Panel; Future; Expected date: 09/30/2025  -     Lipid Panel; Future; Expected date: 09/30/2025  -     TSH; Future; Expected date: 09/30/2025  -     Hemoglobin A1C; Future; Expected date: 09/30/2025  -     Urinalysis; Future; Expected date: 09/30/2025  -     Vitamin D; Future; Expected date: 09/30/2025    3. Acquired hypothyroidism  Assessment & Plan:  Stable  Continue Levothyroxine as prescribed    Orders:  -     CBC Auto Differential; Future; Expected date: 09/30/2025  -     Comprehensive Metabolic Panel; Future; Expected date: 09/30/2025  -     Lipid Panel; Future; Expected date:  09/30/2025  -     TSH; Future; Expected date: 09/30/2025  -     Hemoglobin A1C; Future; Expected date: 09/30/2025  -     Urinalysis; Future; Expected date: 09/30/2025  -     Vitamin D; Future; Expected date: 09/30/2025    4. Memory change  Assessment & Plan:  Mmse 28/30.  Scan in media.  Mom has memory problems.  Will get imaging. Will place referral to neuro as requested. Ok to hold prevagen/neuriva supplement. Monitor symptoms. Contact clinic for concerns. Patient is agreeable to plan and verbalized understanding    Orders:  -     Ambulatory referral/consult to Neurology; Future; Expected date: 04/07/2025  -     CBC Auto Differential; Future; Expected date: 09/30/2025  -     Comprehensive Metabolic Panel; Future; Expected date: 09/30/2025  -     Lipid Panel; Future; Expected date: 09/30/2025  -     TSH; Future; Expected date: 09/30/2025  -     Hemoglobin A1C; Future; Expected date: 09/30/2025  -     Urinalysis; Future; Expected date: 09/30/2025  -     Vitamin D; Future; Expected date: 09/30/2025  -     MRI Brain W WO Contrast; Future; Expected date: 03/31/2025    5. Other amnesia  -     MRI Brain W WO Contrast; Future; Expected date: 03/31/2025    6. Vitamin D deficiency  Assessment & Plan:  Stable  Continue Vitamin D supplements    Orders:  -     CBC Auto Differential; Future; Expected date: 09/30/2025  -     Comprehensive Metabolic Panel; Future; Expected date: 09/30/2025  -     Lipid Panel; Future; Expected date: 09/30/2025  -     TSH; Future; Expected date: 09/30/2025  -     Hemoglobin A1C; Future; Expected date: 09/30/2025  -     Urinalysis; Future; Expected date: 09/30/2025  -     Vitamin D; Future; Expected date: 09/30/2025    7. Class 1 obesity due to excess calories with serious comorbidity and body mass index (BMI) of 34.0 to 34.9 in adult  Assessment & Plan:  Encouraged lifestyle change      8. Overactive bladder  Assessment & Plan:  Stable  Continue Vesicare  Keep appt with Urology for follow up        Orders:  -     solifenacin (VESICARE) 10 MG tablet; Take 1 tablet (10 mg total) by mouth once daily.  Dispense: 90 tablet; Refill: 3    9. Wellness examination  -     CBC Auto Differential; Future; Expected date: 09/30/2025  -     Comprehensive Metabolic Panel; Future; Expected date: 09/30/2025  -     Lipid Panel; Future; Expected date: 09/30/2025  -     TSH; Future; Expected date: 09/30/2025  -     Hemoglobin A1C; Future; Expected date: 09/30/2025  -     Urinalysis; Future; Expected date: 09/30/2025  -     Vitamin D; Future; Expected date: 09/30/2025    10. Abnormal finding of blood chemistry, unspecified  -     Hemoglobin A1C; Future; Expected date: 09/30/2025         Follow up in about 6 months (around 9/30/2025) for Medicare Wellness with labs.          [1]   Current Outpatient Medications on File Prior to Visit   Medication Sig Dispense Refill    albuterol (VENTOLIN HFA) 90 mcg/actuation inhaler Inhale 2 puffs into the lungs every 6 (six) hours as needed for Wheezing. Rescue 18 g 3    alendronate (FOSAMAX) 70 MG tablet Take 1 tablet (70 mg total) by mouth every 7 days. 12 tablet 3    aspirin (ECOTRIN) 81 MG EC tablet Take 81 mg by mouth once daily.      benazepriL (LOTENSIN) 20 MG tablet TAKE ONE TABLET BY MOUTH EVERY DAY. 90 tablet 3    calcium carbonate-vitamin D3 (CALCIUM 600 WITH VITAMIN D3) 600 mg-10 mcg (400 unit) Chew Take 1 tablet by mouth once daily.      coenzyme Q10 200 mg capsule Take 200 mg by mouth.      levothyroxine (SYNTHROID) 75 MCG tablet TAKE ONE TABLET BY MOUTH EVERY DAY BEFORE BREAKFAST. 90 tablet 3    LINZESS 145 mcg Cap capsule Take 145 mcg by mouth.      olopatadine (PATANOL) 0.1 % ophthalmic solution Place 1 drop into both eyes 2 (two) times daily. 5 mL 1    polyethylene glycol (GLYCOLAX) 17 gram/dose powder Take 17 g by mouth.      rosuvastatin (CRESTOR) 10 MG tablet Take 1 tablet (10 mg total) by mouth once daily. 90 tablet 3    [DISCONTINUED] azithromycin (Z-EMMANUEL) 250 MG tablet  Take 1 tablet (250 mg total) by mouth once daily. 6 tablet 0    [DISCONTINUED] B6/folic/B12/coffee/phosphatid (NEURIVA PLUS BRAIN PERFORMANCE ORAL) Take by mouth.      amLODIPine (NORVASC) 10 MG tablet Take 1 tablet (10 mg total) by mouth once daily. 90 tablet 3    esomeprazole (NEXIUM) 40 MG capsule Take 1 capsule (40 mg total) by mouth before breakfast. 90 capsule 3    estradioL (ESTRACE) 0.01 % (0.1 mg/gram) vaginal cream Place 0.5 g vaginally.      [DISCONTINUED] solifenacin (VESICARE) 10 MG tablet Take 1 tablet (10 mg total) by mouth once daily. 90 tablet 3     No current facility-administered medications on file prior to visit.

## 2025-03-31 NOTE — ASSESSMENT & PLAN NOTE
Mmse 28/30.  Scan in media.  Mom has memory problems.  Will get imaging. Will place referral to neuro as requested. Ok to hold prevagen/neuriva supplement. Monitor symptoms. Contact clinic for concerns. Patient is agreeable to plan and verbalized understanding

## 2025-04-03 ENCOUNTER — TELEPHONE (OUTPATIENT)
Dept: FAMILY MEDICINE | Facility: CLINIC | Age: 80
End: 2025-04-03
Payer: MEDICARE

## 2025-04-03 NOTE — TELEPHONE ENCOUNTER
----- Message from Alyse Falk sent at 4/3/2025 11:20 AM CDT -----  Who Called: Mary Lou Hair is requesting assistance/information from provider's office.Symptoms (please be specific): N/A How long has patient had these symptoms:  N/AList of preferred pharmacies on file (remove unneeded): [unfilled]If different, enter pharmacy into here including location and phone number: N/APreferred Method of Contact: Phone CallPatient's Preferred Phone Number on File: [phone] Best Call Back Number, if different:Additional Information: pt stated she would like a cb. Pt did not want to clarify

## 2025-04-09 ENCOUNTER — HOSPITAL ENCOUNTER (OUTPATIENT)
Dept: RADIOLOGY | Facility: HOSPITAL | Age: 80
Discharge: HOME | End: 2025-04-09
Attending: FAMILY MEDICINE
Payer: MEDICARE

## 2025-04-09 DIAGNOSIS — R41.3 MEMORY CHANGE: ICD-10-CM

## 2025-04-09 DIAGNOSIS — R41.3 OTHER AMNESIA: ICD-10-CM

## 2025-04-09 PROCEDURE — 70553 MRI BRAIN STEM W/O & W/DYE: CPT | Mod: TC

## 2025-04-09 PROCEDURE — A9577 INJ MULTIHANCE: HCPCS | Performed by: FAMILY MEDICINE

## 2025-04-09 PROCEDURE — 25500020 PHARM REV CODE 255: Performed by: FAMILY MEDICINE

## 2025-04-09 RX ADMIN — GADOBENATE DIMEGLUMINE 15 ML: 529 INJECTION, SOLUTION INTRAVENOUS at 04:04

## 2025-04-10 ENCOUNTER — RESULTS FOLLOW-UP (OUTPATIENT)
Dept: FAMILY MEDICINE | Facility: CLINIC | Age: 80
End: 2025-04-10

## 2025-04-10 NOTE — PROGRESS NOTES
Please inform patient of results.    1. MRI brain shows chronic age related changes otherwise unremarkable.  I see she has a new patient appt with karen jefferson at neuro on 6/3/25. Keep scheduled appt. Monitor symptoms. Contact clinic for concerns.

## 2025-04-29 DIAGNOSIS — I10 PRIMARY HYPERTENSION: ICD-10-CM

## 2025-04-29 RX ORDER — AMLODIPINE BESYLATE 10 MG/1
10 TABLET ORAL DAILY
Qty: 90 TABLET | Refills: 3 | Status: SHIPPED | OUTPATIENT
Start: 2025-04-29 | End: 2026-04-24

## 2025-04-29 RX ORDER — BENAZEPRIL HYDROCHLORIDE 20 MG/1
20 TABLET ORAL DAILY
Qty: 90 TABLET | Refills: 3 | Status: SHIPPED | OUTPATIENT
Start: 2025-04-29

## 2025-04-29 NOTE — TELEPHONE ENCOUNTER
Copied from CRM #[id number]. Topic: Medications - Medication Refill  >> Apr 29, 2025 10:47 AM Michelle wrote:  Who Called: Mary Lou Grajeda    Refill or New Rx:Refill    RX Name and Strength:amLODIPine (NORVASC) 10 MG tablet  RX Name and Strength:benazepriL (LOTENSIN) 20 MG tablet  How is the patient currently taking it? (ex. 1XDay):  Is this a 30 day or 90 day RX:  Local or Mail Order:  List of preferred pharmacies on file (remove unneeded): [unfilled]  If different Pharmacy is requested, enter Pharmacy information here including location and phone number: [facility]    Ordering Provider:        Patient's Preferred Phone Number on File: [phone]   Best Call Back Number, if different:  Additional Information:

## 2025-06-03 ENCOUNTER — OFFICE VISIT (OUTPATIENT)
Dept: NEUROLOGY | Facility: CLINIC | Age: 80
End: 2025-06-03
Payer: MEDICARE

## 2025-06-03 VITALS
SYSTOLIC BLOOD PRESSURE: 116 MMHG | BODY MASS INDEX: 30.12 KG/M2 | WEIGHT: 170 LBS | HEIGHT: 63 IN | DIASTOLIC BLOOD PRESSURE: 82 MMHG

## 2025-06-03 DIAGNOSIS — G31.84 MILD COGNITIVE IMPAIRMENT: ICD-10-CM

## 2025-06-03 DIAGNOSIS — R41.3 MEMORY CHANGE: ICD-10-CM

## 2025-06-03 DIAGNOSIS — R41.9 COGNITIVE COMPLAINTS: Primary | ICD-10-CM

## 2025-06-03 PROCEDURE — 99213 OFFICE O/P EST LOW 20 MIN: CPT | Mod: PBBFAC | Performed by: NURSE PRACTITIONER

## 2025-06-03 PROCEDURE — 99999 PR PBB SHADOW E&M-EST. PATIENT-LVL III: CPT | Mod: PBBFAC,,, | Performed by: NURSE PRACTITIONER

## 2025-06-04 ENCOUNTER — LAB VISIT (OUTPATIENT)
Dept: LAB | Facility: HOSPITAL | Age: 80
End: 2025-06-04
Attending: NURSE PRACTITIONER
Payer: MEDICARE

## 2025-06-04 DIAGNOSIS — R41.3 MEMORY CHANGE: ICD-10-CM

## 2025-06-04 DIAGNOSIS — G31.84 MILD COGNITIVE IMPAIRMENT: ICD-10-CM

## 2025-06-04 DIAGNOSIS — R41.9 COGNITIVE COMPLAINTS: ICD-10-CM

## 2025-06-04 LAB — VIT B12 SERPL-MCNC: 637 PG/ML (ref 213–816)

## 2025-06-04 PROCEDURE — 84393 TAU PHOSPHORYLATED EA: CPT

## 2025-06-04 PROCEDURE — 36415 COLL VENOUS BLD VENIPUNCTURE: CPT

## 2025-06-04 PROCEDURE — 82607 VITAMIN B-12: CPT

## 2025-06-05 ENCOUNTER — RESULTS FOLLOW-UP (OUTPATIENT)
Dept: NEUROLOGY | Facility: CLINIC | Age: 80
End: 2025-06-05
Payer: MEDICARE

## 2025-06-05 LAB
IMMUNOLOGIST REVIEW: ABNORMAL
M PHOSPHO-TAU 217: 0.65 PG/ML

## 2025-06-16 RX ORDER — ROSUVASTATIN CALCIUM 10 MG/1
10 TABLET, COATED ORAL DAILY
Qty: 90 TABLET | Refills: 3 | Status: SHIPPED | OUTPATIENT
Start: 2025-06-16

## 2025-06-16 NOTE — TELEPHONE ENCOUNTER
Copied from CRM #[id number]. Topic: Medications - Medication Refill  >> Jun 16, 2025 10:24 AM Sonja wrote:  .Who Called: Mary Lou Grajeda    Refill or New Rx:Refill  RX Name and Strength:rosuvastatin (CRESTOR) 10 MG tablet  How is the patient currently taking it? (ex. 1XDay):1x per day   Is this a 30 day or 90 day RX:30   Local or Mail Order:local   List of preferred pharmacies on file (remove unneeded): [id number]   If different Pharmacy is requested, enter Pharmacy information here including location and phone number:    Ordering Provider:purvi      Preferred Method of Contact: Phone Call  Patient's Preferred Phone Number on File: [phone]   Best Call Back Number, if different:  Additional Information:

## 2025-06-17 ENCOUNTER — OFFICE VISIT (OUTPATIENT)
Dept: NEUROLOGY | Facility: CLINIC | Age: 80
End: 2025-06-17
Payer: MEDICARE

## 2025-06-17 VITALS
BODY MASS INDEX: 30.12 KG/M2 | HEIGHT: 63 IN | SYSTOLIC BLOOD PRESSURE: 124 MMHG | WEIGHT: 170 LBS | DIASTOLIC BLOOD PRESSURE: 84 MMHG

## 2025-06-17 DIAGNOSIS — G31.84 MCI (MILD COGNITIVE IMPAIRMENT) WITH MEMORY LOSS: Primary | ICD-10-CM

## 2025-06-17 PROCEDURE — 99214 OFFICE O/P EST MOD 30 MIN: CPT | Mod: S$PBB,,, | Performed by: NURSE PRACTITIONER

## 2025-06-17 PROCEDURE — 99999 PR PBB SHADOW E&M-EST. PATIENT-LVL III: CPT | Mod: PBBFAC,,, | Performed by: NURSE PRACTITIONER

## 2025-06-17 PROCEDURE — 99213 OFFICE O/P EST LOW 20 MIN: CPT | Mod: PBBFAC | Performed by: NURSE PRACTITIONER

## 2025-06-17 RX ORDER — MEMANTINE HYDROCHLORIDE 10 MG/1
TABLET ORAL
Qty: 60 TABLET | Refills: 6 | Status: SHIPPED | OUTPATIENT
Start: 2025-06-17

## 2025-06-17 RX ORDER — OXYBUTYNIN CHLORIDE 5 MG/1
10 TABLET, EXTENDED RELEASE ORAL DAILY
COMMUNITY
End: 2025-06-17

## 2025-06-17 NOTE — PROGRESS NOTES
Neurology Follow up Note    Subjective:         Patient ID: Mary Lou Grajeda is a 80 y.o. female.    Chief Complaint: F/U Memory     HPI:            Test results. Memory is abt the same. Forgets names, places, conversations and repeats herself. Drives, lives w her mother that she takes care of Pts daughter (Donna) is here today.    ROS: as per HPI, otherwise pertinent systems review is negative          Past Medical History:   Diagnosis Date    Arteriosclerosis of coronary artery 07/25/2022    Chronic constipation 07/25/2022    Gastroesophageal reflux disease 07/25/2022    History of breast cancer 07/25/2022    Hyperlipidemia 07/25/2022    Hypertension     Osteoporosis     Overactive bladder 07/25/2022    Personal history of colonic polyps 08/24/2021    COLONOSCOPY       Past Surgical History:   Procedure Laterality Date    COLONOSCOPY  08/24/2021    Andra Fine III, MD    HIP REPLACEMENT ARTHROPLASTY Right 2015       No family history on file.    Social History     Socioeconomic History    Marital status:    Tobacco Use    Smoking status: Never    Smokeless tobacco: Never   Substance and Sexual Activity    Alcohol use: Never    Drug use: Never   Social History Narrative    ** Merged History Encounter **            Review of patient's allergies indicates:   Allergen Reactions    Prochlorperazine     Propoxyphene-asa-caffeine     Propoxyphene Nausea Only       Current Outpatient Medications   Medication Instructions    albuterol (VENTOLIN HFA) 90 mcg/actuation inhaler 2 puffs, Inhalation, Every 6 hours PRN, Rescue    alendronate (FOSAMAX) 70 mg, Oral, Every 7 days    amLODIPine (NORVASC) 10 mg, Oral, Daily    aspirin (ECOTRIN) 81 mg, Daily    benazepriL (LOTENSIN) 20 mg, Oral, Daily    calcium carbonate-vitamin D3 (CALCIUM 600 WITH VITAMIN D3) 600 mg-10 mcg (400 unit) Chew 1 tablet, Daily    coenzyme Q10 200 mg    esomeprazole (NEXIUM) 40 mg, Oral, Before breakfast    estradioL (ESTRACE) 0.5 g     Faxed referral to [facility] "levothyroxine (SYNTHROID) 75 mcg, Oral    LINZESS 145 mcg    memantine (NAMENDA) 10 MG Tab 0.5 tab with breakfast for 1 week then 0.5 tab with breakfast and supper for 1 week then 1 tab with breakfast and 0.5 tab with supper for 1 week then 1 tab twice per day thereafter    olopatadine (PATANOL) 0.1 % ophthalmic solution 1 drop, Both Eyes, 2 times daily    polyethylene glycol (GLYCOLAX) 17 g    rosuvastatin (CRESTOR) 10 mg, Oral, Daily    solifenacin (VESICARE) 10 mg, Oral, Daily       Objective:      Exam:   Visit Vitals  /84 (BP Location: Left arm, Patient Position: Sitting)   Ht 5' 3" (1.6 m)   Wt 77.1 kg (170 lb)   LMP  (LMP Unknown)   BMI 30.11 kg/m²       Physical Exam  Vitals reviewed.   Constitutional:       Appearance: Normal appearance.      Accompanied by:  HENT:      Ears:      Comments: Hearing normal.     Mallampati:     Neck Circumference:   Eyes:      Extraocular Movements: Extraocular movements intact.      VF's ok     Funduscopic exam: disc margins crisp  Cardiovascular:      Rate and Rhythm: Normal rate and regular rhythm.   Pulmonary:      Effort: Pulmonary effort is normal.      Breath sounds: Normal breath sounds.   Musculoskeletal:         General: Normal range of motion.   Skin:     General: Skin is warm and dry.   Neurological:      General: No focal deficit present.      Mental Status: alert and oriented to person, place, and time.      Speech:     Face: symmetric; tongue midline; cheek puff nml     Motor: nonlateralizing     Coordination: F to N ok, no dysmetria     Reflexes:      Sensation: Vib nml     Tone: No ankle clonus     Tremor: none     Plantars: silent     Gait: unassisted and normal.  Psychiatric:         Mood and Affect: Mood normal.         Behavior: Behavior normal.         Assessment/Plan:   MCI with memory loss    Biomarker suggestive of AD pathology [mMqh213 0.645] however in clinic today more of MCI picture but discussed possibility that this may progress to AD in " months/years ahead.      P Tau 217 elevated at 0.645    MRI brain images reviewed - agree with RAD    Start Memantine 10 mg tab; take 0.5 tab with breakfast for 1 week then 0.5 tab with breakfast and supper for 1 week then 1 tab with breakfast and 0.5 tab with supper for 1 week then 1 tab twice per day thereafter    Medication administration personally reviewed with patient by MD/provider. Potential or actual medication changes discussed. Common and potentially serious side effects of medications or medication changes discussed.    Driving discussed    Patient manages meds without issues  Patient manages her finances w/o difficulty     Patient is caring for her ill mother - doing a great job; patients daughter is involved and available and not concerned for safety under current circumstances. Both are well cared for. Discussed patients ability to care for her mother and thus far has been doing great.     MMSE at next OV    Follow up in about 3 months (around 9/17/2025), or if symptoms worsen or fail to improve.      Russel Green, MSN, APRN, AGACNP-BC

## 2025-08-04 ENCOUNTER — LAB VISIT (OUTPATIENT)
Dept: LAB | Facility: HOSPITAL | Age: 80
End: 2025-08-04
Attending: FAMILY MEDICINE
Payer: MEDICARE

## 2025-08-04 DIAGNOSIS — N32.81 OVERACTIVE BLADDER: ICD-10-CM

## 2025-08-04 DIAGNOSIS — I10 PRIMARY HYPERTENSION: ICD-10-CM

## 2025-08-04 DIAGNOSIS — R41.3 MEMORY CHANGE: ICD-10-CM

## 2025-08-04 DIAGNOSIS — E78.2 MIXED HYPERLIPIDEMIA: ICD-10-CM

## 2025-08-04 DIAGNOSIS — Z00.00 WELLNESS EXAMINATION: ICD-10-CM

## 2025-08-04 DIAGNOSIS — E03.9 ACQUIRED HYPOTHYROIDISM: ICD-10-CM

## 2025-08-04 DIAGNOSIS — E55.9 VITAMIN D DEFICIENCY: ICD-10-CM

## 2025-08-04 DIAGNOSIS — R79.9 ABNORMAL FINDING OF BLOOD CHEMISTRY, UNSPECIFIED: ICD-10-CM

## 2025-08-04 LAB
25(OH)D3+25(OH)D2 SERPL-MCNC: 50 NG/ML (ref 30–80)
ALBUMIN SERPL-MCNC: 3.8 G/DL (ref 3.4–4.8)
ALBUMIN/GLOB SERPL: 1.3 RATIO (ref 1.1–2)
ALP SERPL-CCNC: 49 UNIT/L (ref 40–150)
ALT SERPL-CCNC: 8 UNIT/L (ref 0–55)
ANION GAP SERPL CALC-SCNC: 5 MEQ/L
AST SERPL-CCNC: 15 UNIT/L (ref 11–45)
BASOPHILS # BLD AUTO: 0.05 X10(3)/MCL
BASOPHILS NFR BLD AUTO: 0.9 %
BILIRUB SERPL-MCNC: 0.5 MG/DL
BUN SERPL-MCNC: 11 MG/DL (ref 9.8–20.1)
CALCIUM SERPL-MCNC: 9.3 MG/DL (ref 8.4–10.2)
CHLORIDE SERPL-SCNC: 106 MMOL/L (ref 98–107)
CHOLEST SERPL-MCNC: 233 MG/DL
CHOLEST/HDLC SERPL: 4 {RATIO} (ref 0–5)
CO2 SERPL-SCNC: 27 MMOL/L (ref 23–31)
CREAT SERPL-MCNC: 0.74 MG/DL (ref 0.55–1.02)
CREAT/UREA NIT SERPL: 15
EOSINOPHIL # BLD AUTO: 0.32 X10(3)/MCL (ref 0–0.9)
EOSINOPHIL NFR BLD AUTO: 5.9 %
ERYTHROCYTE [DISTWIDTH] IN BLOOD BY AUTOMATED COUNT: 12.5 % (ref 11.5–17)
EST. AVERAGE GLUCOSE BLD GHB EST-MCNC: 108.3 MG/DL
GFR SERPLBLD CREATININE-BSD FMLA CKD-EPI: >60 ML/MIN/1.73/M2
GLOBULIN SER-MCNC: 3 GM/DL (ref 2.4–3.5)
GLUCOSE SERPL-MCNC: 102 MG/DL (ref 82–115)
HBA1C MFR BLD: 5.4 %
HCT VFR BLD AUTO: 39.2 % (ref 37–47)
HDLC SERPL-MCNC: 57 MG/DL (ref 35–60)
HGB BLD-MCNC: 13.4 G/DL (ref 12–16)
IMM GRANULOCYTES # BLD AUTO: 0.01 X10(3)/MCL (ref 0–0.04)
IMM GRANULOCYTES NFR BLD AUTO: 0.2 %
LDLC SERPL CALC-MCNC: 159 MG/DL (ref 50–140)
LYMPHOCYTES # BLD AUTO: 1.29 X10(3)/MCL (ref 0.6–4.6)
LYMPHOCYTES NFR BLD AUTO: 23.7 %
MCH RBC QN AUTO: 32.5 PG (ref 27–31)
MCHC RBC AUTO-ENTMCNC: 34.2 G/DL (ref 33–36)
MCV RBC AUTO: 95.1 FL (ref 80–94)
MONOCYTES # BLD AUTO: 0.39 X10(3)/MCL (ref 0.1–1.3)
MONOCYTES NFR BLD AUTO: 7.2 %
NEUTROPHILS # BLD AUTO: 3.38 X10(3)/MCL (ref 2.1–9.2)
NEUTROPHILS NFR BLD AUTO: 62.1 %
NRBC BLD AUTO-RTO: 0 %
PLATELET # BLD AUTO: 305 X10(3)/MCL (ref 130–400)
PMV BLD AUTO: 9.7 FL (ref 7.4–10.4)
POTASSIUM SERPL-SCNC: 4.2 MMOL/L (ref 3.5–5.1)
PROT SERPL-MCNC: 6.8 GM/DL (ref 5.8–7.6)
RBC # BLD AUTO: 4.12 X10(6)/MCL (ref 4.2–5.4)
SODIUM SERPL-SCNC: 138 MMOL/L (ref 136–145)
TRIGL SERPL-MCNC: 84 MG/DL (ref 37–140)
TSH SERPL-ACNC: 0.51 UIU/ML (ref 0.35–4.94)
VLDLC SERPL CALC-MCNC: 17 MG/DL
WBC # BLD AUTO: 5.44 X10(3)/MCL (ref 4.5–11.5)

## 2025-08-04 PROCEDURE — 83036 HEMOGLOBIN GLYCOSYLATED A1C: CPT

## 2025-08-04 PROCEDURE — 80053 COMPREHEN METABOLIC PANEL: CPT

## 2025-08-04 PROCEDURE — 80061 LIPID PANEL: CPT

## 2025-08-04 PROCEDURE — 82306 VITAMIN D 25 HYDROXY: CPT

## 2025-08-04 PROCEDURE — 36415 COLL VENOUS BLD VENIPUNCTURE: CPT

## 2025-08-04 PROCEDURE — 84443 ASSAY THYROID STIM HORMONE: CPT

## 2025-08-04 PROCEDURE — 85025 COMPLETE CBC W/AUTO DIFF WBC: CPT

## 2025-08-04 RX ORDER — SOLIFENACIN SUCCINATE 10 MG/1
10 TABLET, FILM COATED ORAL DAILY
Qty: 90 TABLET | Refills: 3 | Status: SHIPPED | OUTPATIENT
Start: 2025-08-04 | End: 2026-08-04

## 2025-08-05 NOTE — ASSESSMENT & PLAN NOTE
Previously done labs reviewed with patient at time of appt today  mmg 4/2024 at [facility]. ordered  dexa 3/2022 showed osteoporosis. ordered  Pap 9/2022 with gyn- dr. blake  Colonoscopy with dr ramon 8/2021 with 5 year repeat    Advanced care planning discussed and paperwork given

## 2025-08-05 NOTE — PROGRESS NOTES
Internal Medicine      Patient ID: [id number]     Chief Complaint: Medicare Annual Wellness     HPI:     Mary Lou Grajeda is a 80 y.o. female here today for a Medicare Annual Wellness visit and comprehensive Health Risk Assessment.     presents to the clinic unaccompanied for her wellness visit.  She did labs prior to her appt and it was reviewed with her at time of appt today.       Has seen neuro. Lov 6/17/25. Dx with [facility] but biomarkers suggestive of AD pathology.  Started her on memantine. Has follow up 9/2025.    Mother has memory issues as well.  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   Has been stressful.        The patient has hypertension, HLD, carotid artery stenosis, and CAD and is compliant with her prescription medications.  She denies any chest pain shortness of breath or weakness. Dr. Younger is her cardiologist. carotid us with him 7/2022.        She has hypothyroidism and is compliant with her Synthroid.      She has constipation and is on linzess 3x/week. Has GERD and is on nexium. GI is dr. ramon. doing some pelvic floor PT at [facility].        she has OAB and on vesicare.  she also has a pessary and has seen dr. velma del nagel 12/2021.  He retired.  She has appointment with new MD- dr. Salas     Had [facility] 7/2022.  End of July had freezer that leaked freon caused her to have chest pain.      Her mammograms were ordered by Dr. Hassan due to her history of breast cancer which was dx in 1994 and treated with chemo and radiation as well as tamoxifen x 5 years. Her last appt with her was 2/2019. She discharged her and has her to return only prn. MMG at [facility] 4/2024. Ordered. Dexa 3/2022 at [facility] showed osteoporosis.  She is on calcium and vitamin d and fosamax. she also reports a history of low vit d. Her Pap smears are done with her GYN, Dr. Manjarrez. Saw her 9/2022. Her last colonoscopy was done in 8/2021 with Dr. Ramon. recommended repeat in 5 years. copy in chart.     She had right cataract with   Carmelo on 1/24/23.       The patient had her right hip replacement done on November 27, 2018, with Dr. Bal. She also had a right radial head fx from fall 2/2021. saw dr. travis. did not need surgery.        Is ALLERGIC to Compazine and Darvon. She drinks alcohol rarely. She does not smoke. completed covid series. utd on pneumovax and tdap. Got shingrix at pharmacy              Health Maintenance         Date Due Completion Date    DEXA Scan 03/25/2024 3/25/2022    COVID-19 Vaccine (6 - 2024-25 season) 09/01/2024 1/11/2024    Shingles Vaccine (3 of 3) 08/06/2026 (Originally 6/8/2022) 4/13/2022    Influenza Vaccine (1) 09/01/2025 3/31/2025 (Declined)    Override on 3/31/2025: Declined    Lipid Panel 08/04/2030 8/4/2025    TETANUS VACCINE 08/18/2031 8/18/2021             Past Medical History:   Diagnosis Date    Arteriosclerosis of coronary artery 07/25/2022    Chronic constipation 07/25/2022    Gastroesophageal reflux disease 07/25/2022    History of breast cancer 07/25/2022    Hyperlipidemia 07/25/2022    Hypertension     Osteoporosis     Overactive bladder 07/25/2022    Personal history of colonic polyps 08/24/2021    COLONOSCOPY        Past Surgical History:   Procedure Laterality Date    COLONOSCOPY  08/24/2021    Andra Fine III, MD    HIP REPLACEMENT ARTHROPLASTY Right 2015        Social History     Socioeconomic History    Marital status:    Tobacco Use    Smoking status: Never    Smokeless tobacco: Never   Substance and Sexual Activity    Alcohol use: Never    Drug use: Never   Social History Narrative    ** Merged History Encounter **             No family history on file.     Current Outpatient Medications   Medication Instructions    albuterol (VENTOLIN HFA) 90 mcg/actuation inhaler 2 puffs, Inhalation, Every 6 hours PRN, Rescue    alendronate (FOSAMAX) 70 mg, Oral, Every 7 days    amLODIPine (NORVASC) 10 mg, Oral, Daily    aspirin (ECOTRIN) 81 mg, Daily    benazepriL (LOTENSIN) 20 mg, Oral,  Daily    calcium carbonate-vitamin D3 (CALCIUM 600 WITH VITAMIN D3) 600 mg-10 mcg (400 unit) Chew 1 tablet, Daily    coenzyme Q10 200 mg    esomeprazole (NEXIUM) 40 mg, Oral, Before breakfast    estradioL (ESTRACE) 0.5 g    levothyroxine (SYNTHROID) 75 mcg, Oral    LINZESS 145 mcg    memantine (NAMENDA) 10 MG Tab 0.5 tab with breakfast for 1 week then 0.5 tab with breakfast and supper for 1 week then 1 tab with breakfast and 0.5 tab with supper for 1 week then 1 tab twice per day thereafter    olopatadine (PATANOL) 0.1 % ophthalmic solution 1 drop, Both Eyes, 2 times daily    polyethylene glycol (GLYCOLAX) 17 g    rosuvastatin (CRESTOR) 20 mg, Oral, Daily, For cholesterol    solifenacin (VESICARE) 10 mg, Oral, Daily       Review of patient's allergies indicates:   Allergen Reactions    Prochlorperazine      Other Reaction(s): Unknown    Propoxyphene-asa-caffeine     Propoxyphene Nausea Only        Immunization History   Administered Date(s) Administered    COVID-19, MRNA, LN-S, PF (Pfizer) (Gray Cap) 11/03/2022    COVID-19, MRNA, LN-S, PF (Pfizer) (Purple Cap) 01/12/2021, 02/02/2021, 10/06/2021    COVID-19, mRNA, LNP-S, PF, nina-sucrose, 30 mcg/0.3 mL (Pfizer Ages 12+) 01/11/2024    Influenza 11/06/2014, 11/06/2014, 09/27/2018    Influenza (FLUAD) - Quadrivalent - Adjuvanted - PF *Preferred* (65+) 10/21/2021, 10/20/2022, 09/19/2023    Influenza - Quadrivalent - High Dose - PF (65 years and older) 09/24/2020    Influenza - Trivalent - Fluarix, Flulaval, Fluzone, Afluria - PF 11/06/2014, 09/27/2018    Influenza - Trivalent - Fluzone High Dose - PF (65 years and older) 10/30/2013, 11/09/2015, 11/30/2016, 10/16/2017, 09/27/2018, 11/18/2019, 09/24/2020    Pneumococcal Conjugate - 13 Valent 02/23/2016    Pneumococcal Polysaccharide - 23 Valent 02/07/2018    RSVpreF (Arexvy) 01/05/2024    Tdap 08/18/2021    Zoster 09/24/2013    Zoster Recombinant 04/13/2022        Patient Care Team:  Anitha Avila MD as PCP - General  "(Family Medicine)  Anitha Avila MD Noel, Jacque F III, MD as Consulting Physician (Gastroenterology)  Franc Bal MD as Consulting Physician (Orthopedic Surgery)  Jan Younger MD as Consulting Physician (Cardiology)  Anitha Manjarrez MD as Obstetrician (Obstetrics)    Subjective:     Review of Systems   Constitutional: Negative.    HENT: Negative.     Respiratory: Negative.     Cardiovascular: Negative.    Gastrointestinal:  Positive for constipation.   Genitourinary: Negative.        12 point review of systems conducted, negative except as stated in the history of present illness. See HPI for details.    Objective:     Visit Vitals  /84 (BP Location: Left arm, Patient Position: Sitting)   Pulse 92   Temp 98.2 °F (36.8 °C) (Temporal)   Resp 16   Ht 5' 3" (1.6 m)   Wt 73.9 kg (163 lb)   LMP  (LMP Unknown)   SpO2 98%   BMI 28.87 kg/m²       Physical Exam  Vitals and nursing note reviewed.   Constitutional:       Appearance: Normal appearance. She is normal weight.   HENT:      Head: Normocephalic and atraumatic.      Nose: Nose normal.      Mouth/Throat:      Mouth: Mucous membranes are moist.      Pharynx: Oropharynx is clear.   Eyes:      Extraocular Movements: Extraocular movements intact.   Cardiovascular:      Rate and Rhythm: Normal rate and regular rhythm.      Pulses: Normal pulses.      Heart sounds: Normal heart sounds.   Pulmonary:      Effort: Pulmonary effort is normal.      Breath sounds: Normal breath sounds.   Musculoskeletal:         General: Normal range of motion.      Cervical back: Normal range of motion.   Skin:     General: Skin is warm and dry.   Neurological:      General: No focal deficit present.      Mental Status: She is alert and oriented to person, place, and time. Mental status is at baseline.   Psychiatric:         Mood and Affect: Mood normal.         Assessment:       ICD-10-CM ICD-9-CM   1. Medicare annual wellness visit, subsequent  Z00.00 V70.0   2. " Advanced care planning/counseling discussion  Z71.89 V65.49   3. Primary hypertension  I10 401.9   4. Mixed hyperlipidemia  E78.2 272.2   5. MCI (mild cognitive impairment) with memory loss  G31.84 331.83   6. Acquired hypothyroidism  E03.9 244.9   7. Chronic constipation  K59.09 564.00   8. Overactive bladder  N32.81 596.51   9. Vitamin D deficiency  E55.9 268.9   10. Gastroesophageal reflux disease without esophagitis  K21.9 530.81   11. Postmenopausal  Z78.0 V49.81   12. Osteoporosis, unspecified osteoporosis type, unspecified pathological fracture presence  M81.0 733.00   13. Breast cancer screening by mammogram  Z12.31 V76.12        Plan:     1. Medicare annual wellness visit, subsequent  Assessment & Plan:  Previously done labs reviewed with patient at time of appt today  mmg 4/2024 at [facility]. ordered  dexa 3/2022 showed osteoporosis. ordered  Pap 9/2022 with gyn- dr. blake  Colonoscopy with dr ramon 8/2021 with 5 year repeat    Advanced care planning discussed and paperwork given      2. Advanced care planning/counseling discussion  Assessment & Plan:  Advanced care planning discussed and paperwork given.    I attest that I have had a face to face discussion with patient and or surrogate decision maker.   Included surrogate decision maker: NO  Advanced directive in chart: NO  LAPOST: NO    Total time spent: 18 minutes        3. Primary hypertension  Assessment & Plan:  Well controlled on current prescriptions- norvasc and benazepril. On asa.  Keep appts with cards          4. Mixed hyperlipidemia  Assessment & Plan:  Stable on statin and coq10. Keep appts with cards    Lab Results   Component Value Date    CHOL 233 (H) 08/04/2025    CHOL 171 09/30/2024    CHOL 176 07/30/2024      Lab Results   Component Value Date    HDL 57 08/04/2025    HDL 61 (H) 09/30/2024    HDL 67 (H) 07/30/2024     Lab Results   Component Value Date    LDLCALC 159.00 (H) 08/04/2025    LDLCALC 90.00 09/30/2024    LDLCALC 94.00 07/30/2024  "     Lab Results   Component Value Date    TRIG 84 08/04/2025    TRIG 100 09/30/2024    TRIG 76 07/30/2024     Lab Results   Component Value Date    TOTALCHOLEST 4 08/04/2025    TOTALCHOLEST 3 09/30/2024    TOTALCHOLEST 3 07/30/2024     No results found for: "NONHDLCHOL"  No results found for: "CHOLHDL"      Orders:  -     rosuvastatin (CRESTOR) 20 MG tablet; Take 1 tablet (20 mg total) by mouth once daily. For cholesterol  Dispense: 90 tablet; Refill: 3    5. MCI (mild cognitive impairment) with memory loss  Assessment & Plan:  On namenda per neuro. Keep scheduled appts.  +biomarkers 6/2025 for AD      6. Acquired hypothyroidism  Assessment & Plan:  Stable  Continue Levothyroxine as prescribed    Lab Results   Component Value Date    TSH 0.511 08/04/2025           7. Chronic constipation  Assessment & Plan:  Stable on linzess and miralax prn      8. Overactive bladder  Assessment & Plan:  Stable  Continue Vesicare  Keep appt with Urology for follow up         9. Vitamin D deficiency  Assessment & Plan:  Stable  Continue Vitamin D supplements      10. Gastroesophageal reflux disease without esophagitis  Assessment & Plan:  Stable on ppi.       11. Postmenopausal  Assessment & Plan:  Dexa 3/2022 at [facility] showed osteoporosis.  On ca and vit d and fosamax.   Encouraged weight bearing exercises. Repeat ordered    Orders:  -     DXA Bone Density Axial Skeleton 1 or more sites; Future; Expected date: 08/06/2025    12. Osteoporosis, unspecified osteoporosis type, unspecified pathological fracture presence  Assessment & Plan:  See postmenopausal A&P      13. Breast cancer screening by mammogram  -     Mammo Digital Screening Bilat w/ Michael (XPD); Future; Expected date: 08/06/2025         A comprehensive HEALTH RISK ASSESSMENT was completed today. Results are summarized below:    There are NO EMOTIONAL/SOCIAL CONCERNS identified on today's screening for Social Isolation, Depression and Anxiety.    There are NO COGNITIVE " FUNCTION CONCERNS identified on today's screening.  There are NO FUNCTIONAL OR SAFETY CONCERNS were identified on today's screening for Physical Symptoms, Nutritional, Cognitive Function, Home Safety/Living Situation, Fall Risk, Activities of Daily Living, Independent Activities of Daily Living, Physical Activity, Timed Up and Go test and Whisper test.   The patient reports NO OPIOID PRESCRIPTIONS. This was confirmed through medication reconciliation.    The patient is NOT A TOBACCO USER.        All Questions regarding food, transportation or housing were not answered today.    The patient was asked and declined the use of a free .    Advance Care Planning   Today we discussed advance care planning. She is interested in learning more about how to make Advance Directives. Information was provided and I offered to discuss more at her discretion.         Provided patient with a 5-10 year written screening schedule and personal prevention plan. Recommendations were developed using the USPSTF age appropriate recommendations. Education, counseling, and referrals were provided as needed. After Visit Summary printed and given to patient, which includes a list of additional screenings\tests needed.    Follow up in about 1 year (around 8/6/2026) for Medicare Wellness with labs. In addition to their scheduled follow up, the patient has also been instructed to follow up on as needed basis.     Future Appointments   Date Time Provider Department Center   9/23/2025  1:00 PM Russel Green AGACNP[facility] [address] [address]   8/10/2026  1:00 PM Anitha Avila MD [facility] [place] Avila MD

## 2025-08-06 ENCOUNTER — OFFICE VISIT (OUTPATIENT)
Dept: FAMILY MEDICINE | Facility: CLINIC | Age: 80
End: 2025-08-06
Payer: MEDICARE

## 2025-08-06 VITALS
HEIGHT: 63 IN | BODY MASS INDEX: 28.88 KG/M2 | OXYGEN SATURATION: 98 % | RESPIRATION RATE: 16 BRPM | DIASTOLIC BLOOD PRESSURE: 84 MMHG | SYSTOLIC BLOOD PRESSURE: 128 MMHG | HEART RATE: 92 BPM | TEMPERATURE: 98 F | WEIGHT: 163 LBS

## 2025-08-06 DIAGNOSIS — K21.9 GASTROESOPHAGEAL REFLUX DISEASE WITHOUT ESOPHAGITIS: ICD-10-CM

## 2025-08-06 DIAGNOSIS — I10 PRIMARY HYPERTENSION: ICD-10-CM

## 2025-08-06 DIAGNOSIS — Z12.31 BREAST CANCER SCREENING BY MAMMOGRAM: ICD-10-CM

## 2025-08-06 DIAGNOSIS — M81.0 OSTEOPOROSIS, UNSPECIFIED OSTEOPOROSIS TYPE, UNSPECIFIED PATHOLOGICAL FRACTURE PRESENCE: ICD-10-CM

## 2025-08-06 DIAGNOSIS — G31.84 MCI (MILD COGNITIVE IMPAIRMENT) WITH MEMORY LOSS: ICD-10-CM

## 2025-08-06 DIAGNOSIS — Z00.00 MEDICARE ANNUAL WELLNESS VISIT, SUBSEQUENT: Primary | ICD-10-CM

## 2025-08-06 DIAGNOSIS — E78.2 MIXED HYPERLIPIDEMIA: ICD-10-CM

## 2025-08-06 DIAGNOSIS — Z71.89 ADVANCED CARE PLANNING/COUNSELING DISCUSSION: ICD-10-CM

## 2025-08-06 DIAGNOSIS — N32.81 OVERACTIVE BLADDER: ICD-10-CM

## 2025-08-06 DIAGNOSIS — E55.9 VITAMIN D DEFICIENCY: ICD-10-CM

## 2025-08-06 DIAGNOSIS — K59.09 CHRONIC CONSTIPATION: ICD-10-CM

## 2025-08-06 DIAGNOSIS — E03.9 ACQUIRED HYPOTHYROIDISM: ICD-10-CM

## 2025-08-06 DIAGNOSIS — Z78.0 POSTMENOPAUSAL: ICD-10-CM

## 2025-08-06 PROBLEM — E66.811 CLASS 1 OBESITY DUE TO EXCESS CALORIES WITH SERIOUS COMORBIDITY AND BODY MASS INDEX (BMI) OF 34.0 TO 34.9 IN ADULT: Status: RESOLVED | Noted: 2025-03-31 | Resolved: 2025-08-06

## 2025-08-06 PROBLEM — R41.9 COGNITIVE COMPLAINTS: Status: RESOLVED | Noted: 2025-06-03 | Resolved: 2025-08-06

## 2025-08-06 PROBLEM — E66.09 CLASS 1 OBESITY DUE TO EXCESS CALORIES WITH SERIOUS COMORBIDITY AND BODY MASS INDEX (BMI) OF 34.0 TO 34.9 IN ADULT: Status: RESOLVED | Noted: 2025-03-31 | Resolved: 2025-08-06

## 2025-08-06 PROBLEM — M85.80 OSTEOPENIA: Status: RESOLVED | Noted: 2022-07-25 | Resolved: 2025-08-06

## 2025-08-06 PROCEDURE — G0439 PPPS, SUBSEQ VISIT: HCPCS | Mod: ,,, | Performed by: FAMILY MEDICINE

## 2025-08-06 RX ORDER — ROSUVASTATIN CALCIUM 20 MG/1
20 TABLET, COATED ORAL DAILY
Qty: 90 TABLET | Refills: 3 | Status: SHIPPED | OUTPATIENT
Start: 2025-08-06 | End: 2026-08-06

## 2025-08-06 NOTE — ASSESSMENT & PLAN NOTE
Stable  Continue Levothyroxine as prescribed    Lab Results   Component Value Date    TSH 0.511 08/04/2025

## 2025-08-06 NOTE — ASSESSMENT & PLAN NOTE
Advanced care planning discussed and paperwork given.    I attest that I have had a face to face discussion with patient and or surrogate decision maker.   Included surrogate decision maker: NO  Advanced directive in chart: NO  LAPOST: NO    Total time spent: 18 minutes

## 2025-08-06 NOTE — ASSESSMENT & PLAN NOTE
"Stable on statin and coq10. Keep appts with cards    Lab Results   Component Value Date    CHOL 233 (H) 08/04/2025    CHOL 171 09/30/2024    CHOL 176 07/30/2024      Lab Results   Component Value Date    HDL 57 08/04/2025    HDL 61 (H) 09/30/2024    HDL 67 (H) 07/30/2024     Lab Results   Component Value Date    LDLCALC 159.00 (H) 08/04/2025    LDLCALC 90.00 09/30/2024    LDLCALC 94.00 07/30/2024      Lab Results   Component Value Date    TRIG 84 08/04/2025    TRIG 100 09/30/2024    TRIG 76 07/30/2024     Lab Results   Component Value Date    TOTALCHOLEST 4 08/04/2025    TOTALCHOLEST 3 09/30/2024    TOTALCHOLEST 3 07/30/2024     No results found for: "NONHDLCHOL"  No results found for: "CHOLHDL"    "

## 2025-08-06 NOTE — ASSESSMENT & PLAN NOTE
Dexa 3/2022 at [facility] showed osteoporosis.  On ca and vit d and fosamax.   Encouraged weight bearing exercises. Repeat ordered

## 2025-08-06 NOTE — ASSESSMENT & PLAN NOTE
Well controlled on current prescriptions- norvasc and benazepril. On asa.  Keep appts with cards